# Patient Record
Sex: FEMALE | Employment: OTHER | URBAN - METROPOLITAN AREA
[De-identification: names, ages, dates, MRNs, and addresses within clinical notes are randomized per-mention and may not be internally consistent; named-entity substitution may affect disease eponyms.]

---

## 2020-08-13 NOTE — PROGRESS NOTES
SUBJECTIVE:   CC: Kashmir Bonilla is an 39 year old woman who presents for preventive health visit.     Healthy Habits:     Getting at least 3 servings of Calcium per day:  Yes    Bi-annual eye exam:  NO    Dental care twice a year:  NO    Sleep apnea or symptoms of sleep apnea:  None    Diet:  Regular (no restrictions)    Frequency of exercise:  1 day/week    Duration of exercise:  30-45 minutes    Taking medications regularly:  Yes    Medication side effects:  Significant flushing    PHQ-2 Total Score: 0    Additional concerns today:  No    -------------------------------------    Today's PHQ-2 Score:   PHQ-2 ( 1999 Pfizer) 8/18/2020   Q1: Little interest or pleasure in doing things 0   Q2: Feeling down, depressed or hopeless 0   PHQ-2 Score 0   Q1: Little interest or pleasure in doing things Not at all   Q2: Feeling down, depressed or hopeless Not at all   PHQ-2 Score 0       Abuse: Current or Past(Physical, Sexual or Emotional)- No  Do you feel safe in your environment? Yes        Social History     Tobacco Use     Smoking status: Never Smoker     Smokeless tobacco: Never Used   Substance Use Topics     Alcohol use: Never     Frequency: Never     Alcohol Use 8/18/2020   Prescreen: >3 drinks/day or >7 drinks/week? No       Reviewed orders with patient.  Reviewed health maintenance and updated orders accordingly - Yes  Labs reviewed in EPIC  BP Readings from Last 3 Encounters:   08/18/20 112/70    Wt Readings from Last 3 Encounters:   08/18/20 89.8 kg (198 lb)                  Patient Active Problem List   Diagnosis     TB lung, latent     Hx of ectopic pregnancy     History of chlamydia     Controlled substance agreement signed     Iron deficiency anemia due to chronic blood loss     Eczema, unspecified type     History reviewed. No pertinent surgical history.    Social History     Tobacco Use     Smoking status: Never Smoker     Smokeless tobacco: Never Used   Substance Use Topics     Alcohol use: Never      "Frequency: Never     History reviewed. No pertinent family history.      Current Outpatient Medications   Medication Sig Dispense Refill     clobetasol (TEMOVATE) 0.05 % external cream Apply topically 2 times daily 60 g 0     ferrous sulfate (FEROSUL) 325 (65 Fe) MG tablet Take 1 tablet (325 mg) by mouth 3 times daily (with meals) 90 tablet 2     sulfamethoxazole-trimethoprim (BACTRIM DS) 800-160 MG tablet Take 1 tablet by mouth 2 times daily for 5 days 10 tablet 0     No Known Allergies  Recent Labs   Lab Test 08/18/20  1053   CR 0.86   GFRESTIMATED Not Calculated   GFRESTBLACK Not Calculated   POTASSIUM 3.9        Mammogram not appropriate for this patient based on age.    Pertinent mammograms are reviewed under the imaging tab.  History of abnormal Pap smear: NO - age 30-65 PAP every 5 years with negative HPV co-testing recommended     Reviewed and updated as needed this visit by clinical staff  Tobacco  Allergies  Meds  Med Hx  Surg Hx  Fam Hx  Soc Hx        Reviewed and updated as needed this visit by Provider          History reviewed. No pertinent past medical history.   History reviewed. No pertinent surgical history.    Review of Systems   Constitutional: Negative for chills.   HENT: Negative for congestion.    Respiratory: Negative for cough.    Cardiovascular: Negative for chest pain.   Gastrointestinal: Positive for abdominal pain and constipation. Negative for diarrhea.   Genitourinary: Negative for hematuria.   Neurological: Negative for dizziness.        OBJECTIVE:   /70   Pulse 77   Temp 97.5  F (36.4  C) (Temporal)   Ht 1.57 m (5' 1.81\")   Wt 89.8 kg (198 lb)   LMP 08/02/2020   SpO2 99%   BMI 36.44 kg/m    Physical Exam  Constitutional:       General: Kashmir Bonilla is not in acute distress.     Appearance: Normal appearance. Kashmir Bonilla is well-developed and normal weight. Kashmir Bonilla is not ill-appearing, toxic-appearing or diaphoretic.   HENT:      Head: " Normocephalic and atraumatic.      Right Ear: Tympanic membrane, ear canal and external ear normal.      Left Ear: Tympanic membrane, ear canal and external ear normal.      Nose: No rhinorrhea.      Mouth/Throat:      Mouth: Mucous membranes are moist.      Pharynx: Oropharynx is clear. No oropharyngeal exudate.   Eyes:      Extraocular Movements: Extraocular movements intact.      Conjunctiva/sclera: Conjunctivae normal.      Pupils: Pupils are equal, round, and reactive to light.   Neck:      Musculoskeletal: Normal range of motion and neck supple.   Cardiovascular:      Rate and Rhythm: Normal rate and regular rhythm.      Pulses: Normal pulses.      Heart sounds: Normal heart sounds. No murmur. No friction rub. No gallop.    Pulmonary:      Effort: Pulmonary effort is normal. No respiratory distress.      Breath sounds: Normal breath sounds. No stridor. No wheezing, rhonchi or rales.   Chest:      Chest wall: No tenderness.   Abdominal:      General: Bowel sounds are normal. There is no distension.      Palpations: Abdomen is soft. There is no mass.      Tenderness: There is no abdominal tenderness. There is no right CVA tenderness, left CVA tenderness, guarding or rebound.      Hernia: No hernia is present.   Musculoskeletal: Normal range of motion.         General: No tenderness.   Neurological:      General: No focal deficit present.      Mental Status: Kashmir Bonilla is alert and oriented to person, place, and time.   Psychiatric:         Mood and Affect: Mood normal.         Behavior: Behavior normal.         Thought Content: Thought content normal.         Judgment: Judgment normal.       Diagnostic Test Results:  Labs reviewed in Epic    ASSESSMENT/PLAN:     1. Routine general medical examination at a health care facility    2. Iron deficiency anemia due to chronic blood loss  Patient has ongoing symptoms of heavy menstrual.'s and the check on her CBC showed a hemoglobin of 7 indicating moderate to  "severe anemia.  She has a angiogram (could not man-hour to tell me gynecologist that she follows through with OGI and has been diagnosed with fibroids but plans on conceiving another child. Advised to take iron supplementation to help with anemia  - CBC with platelets and differential  - Basic metabolic panel  (Ca, Cl, CO2, Creat, Gluc, K, Na, BUN)    3. Eczema, unspecified type  - clobetasol (TEMOVATE) 0.05 % external cream; Apply topically 2 times daily  Dispense: 60 g; Refill: 0    4. Encounter for counseling regarding immunization  She is planning on going to college and needs documentation of prior immunization which she had done in [place] and does not have any records of.  titres ordered  Forms filled out  - Rubeola Antibody IgG  - Rubella Antibody IgG Quantitative  - Quantiferon-TB Gold Plus  - Mumps Immune Status, IgG  - Poliovirus Antibodies  - Hepatitis B Surface Antibody  - Varicella Zoster Virus Antibody IgG    5. Screening for HIV (human immunodeficiency virus)  - HIV Screening      COUNSELING:  Reviewed preventive health counseling, as reflected in patient instructions       Regular exercise       Healthy diet/nutrition    Estimated body mass index is 36.44 kg/m  as calculated from the following:    Height as of this encounter: 1.57 m (5' 1.81\").    Weight as of this encounter: 89.8 kg (198 lb).         reports that Kashmir Bonilla has never smoked. Kashmir Bonilla has never used smokeless tobacco.      Counseling Resources:  ATP IV Guidelines  Pooled Cohorts Equation Calculator  Breast Cancer Risk Calculator  FRAX Risk Assessment  ICSI Preventive Guidelines  Dietary Guidelines for Americans, 2010  USDA's MyPlate  ASA Prophylaxis  Lung CA Screening    Lindsay Nelson MD  [facility]  Answers for HPI/ROS submitted by the patient on 8/18/2020   Annual Exam:  Blood in stool: No    "

## 2020-08-18 ENCOUNTER — OFFICE VISIT (OUTPATIENT)
Dept: FAMILY MEDICINE | Facility: OTHER | Age: 40
End: 2020-08-18
Payer: COMMERCIAL

## 2020-08-18 ENCOUNTER — TELEPHONE (OUTPATIENT)
Dept: FAMILY MEDICINE | Facility: OTHER | Age: 40
End: 2020-08-18

## 2020-08-18 VITALS
SYSTOLIC BLOOD PRESSURE: 112 MMHG | HEART RATE: 77 BPM | TEMPERATURE: 97.5 F | BODY MASS INDEX: 36.44 KG/M2 | WEIGHT: 198 LBS | HEIGHT: 62 IN | OXYGEN SATURATION: 99 % | DIASTOLIC BLOOD PRESSURE: 70 MMHG

## 2020-08-18 DIAGNOSIS — L30.9 ECZEMA, UNSPECIFIED TYPE: ICD-10-CM

## 2020-08-18 DIAGNOSIS — Z71.85 ENCOUNTER FOR COUNSELING REGARDING IMMUNIZATION: ICD-10-CM

## 2020-08-18 DIAGNOSIS — Z00.00 ROUTINE GENERAL MEDICAL EXAMINATION AT A HEALTH CARE FACILITY: Primary | ICD-10-CM

## 2020-08-18 DIAGNOSIS — D50.0 IRON DEFICIENCY ANEMIA DUE TO CHRONIC BLOOD LOSS: ICD-10-CM

## 2020-08-18 DIAGNOSIS — Z11.4 SCREENING FOR HIV (HUMAN IMMUNODEFICIENCY VIRUS): ICD-10-CM

## 2020-08-18 DIAGNOSIS — D50.0 IRON DEFICIENCY ANEMIA DUE TO CHRONIC BLOOD LOSS: Primary | ICD-10-CM

## 2020-08-18 LAB
ANION GAP SERPL CALCULATED.3IONS-SCNC: 4 MMOL/L
BASOPHILS # BLD AUTO: 0.1 10E9/L (ref 0–0.2)
BASOPHILS NFR BLD AUTO: 1.8 %
BUN SERPL-MCNC: 7 MG/DL
CALCIUM SERPL-MCNC: 8.6 MG/DL
CHLORIDE SERPL-SCNC: 107 MMOL/L
CO2 SERPL-SCNC: 27 MMOL/L
CREAT SERPL-MCNC: 0.86 MG/DL
DIFFERENTIAL METHOD BLD: ABNORMAL
EOSINOPHIL # BLD AUTO: 0.1 10E9/L (ref 0–0.7)
EOSINOPHIL NFR BLD AUTO: 4 %
ERYTHROCYTE [DISTWIDTH] IN BLOOD BY AUTOMATED COUNT: 21.7 % (ref 10–15)
GFR SERPL CREATININE-BSD FRML MDRD: NORMAL ML/MIN/{1.73_M2}
GLUCOSE SERPL-MCNC: 92 MG/DL (ref 70–99)
HCT VFR BLD AUTO: 25.1 %
HGB BLD-MCNC: 7 G/DL
LYMPHOCYTES # BLD AUTO: 1.2 10E9/L (ref 0.8–5.3)
LYMPHOCYTES NFR BLD AUTO: 37.5 %
MCH RBC QN AUTO: 17 PG (ref 26.5–33)
MCHC RBC AUTO-ENTMCNC: 27.9 G/DL (ref 31.5–36.5)
MCV RBC AUTO: 61 FL (ref 78–100)
MONOCYTES # BLD AUTO: 0.3 10E9/L (ref 0–1.3)
MONOCYTES NFR BLD AUTO: 9.8 %
NEUTROPHILS # BLD AUTO: 1.5 10E9/L (ref 1.6–8.3)
NEUTROPHILS NFR BLD AUTO: 46.9 %
PLATELET # BLD AUTO: 241 10E9/L
POTASSIUM SERPL-SCNC: 3.9 MMOL/L
RBC # BLD AUTO: 4.12 10E12/L
SODIUM SERPL-SCNC: 138 MMOL/L
WBC # BLD AUTO: 3.3 10E9/L

## 2020-08-18 PROCEDURE — 86787 VARICELLA-ZOSTER ANTIBODY: CPT | Performed by: FAMILY MEDICINE

## 2020-08-18 PROCEDURE — 99385 PREV VISIT NEW AGE 18-39: CPT | Performed by: FAMILY MEDICINE

## 2020-08-18 PROCEDURE — 86658 ENTEROVIRUS ANTIBODY: CPT | Mod: 90 | Performed by: FAMILY MEDICINE

## 2020-08-18 PROCEDURE — 86765 RUBEOLA ANTIBODY: CPT | Performed by: FAMILY MEDICINE

## 2020-08-18 PROCEDURE — 86762 RUBELLA ANTIBODY: CPT | Performed by: FAMILY MEDICINE

## 2020-08-18 PROCEDURE — 86706 HEP B SURFACE ANTIBODY: CPT | Performed by: FAMILY MEDICINE

## 2020-08-18 PROCEDURE — 99000 SPECIMEN HANDLING OFFICE-LAB: CPT | Performed by: FAMILY MEDICINE

## 2020-08-18 PROCEDURE — 36415 COLL VENOUS BLD VENIPUNCTURE: CPT | Performed by: FAMILY MEDICINE

## 2020-08-18 PROCEDURE — 85025 COMPLETE CBC W/AUTO DIFF WBC: CPT | Performed by: FAMILY MEDICINE

## 2020-08-18 PROCEDURE — 80048 BASIC METABOLIC PNL TOTAL CA: CPT | Performed by: FAMILY MEDICINE

## 2020-08-18 PROCEDURE — 99213 OFFICE O/P EST LOW 20 MIN: CPT | Mod: 25 | Performed by: FAMILY MEDICINE

## 2020-08-18 PROCEDURE — 86735 MUMPS ANTIBODY: CPT | Performed by: FAMILY MEDICINE

## 2020-08-18 PROCEDURE — 87389 HIV-1 AG W/HIV-1&-2 AB AG IA: CPT | Performed by: FAMILY MEDICINE

## 2020-08-18 PROCEDURE — 86481 TB AG RESPONSE T-CELL SUSP: CPT | Performed by: FAMILY MEDICINE

## 2020-08-18 RX ORDER — CLOBETASOL PROPIONATE 0.5 MG/G
CREAM TOPICAL 2 TIMES DAILY
Qty: 60 G | Refills: 0 | COMMUNITY
Start: 2020-08-18 | End: 2021-11-12

## 2020-08-18 SDOH — HEALTH STABILITY: MENTAL HEALTH: HOW OFTEN DO YOU HAVE A DRINK CONTAINING ALCOHOL?: NEVER

## 2020-08-18 ASSESSMENT — ENCOUNTER SYMPTOMS
CHILLS: 0
DIARRHEA: 0
HEMATURIA: 0
DIZZINESS: 0
ABDOMINAL PAIN: 1
COUGH: 0
CONSTIPATION: 1
HEMATOCHEZIA: 0

## 2020-08-18 ASSESSMENT — PAIN SCALES - GENERAL: PAINLEVEL: NO PAIN (0)

## 2020-08-18 ASSESSMENT — MIFFLIN-ST. JEOR: SCORE: 1694.37

## 2020-08-18 NOTE — TELEPHONE ENCOUNTER
Attempted to contact patient, unable to reach patient.     MD Leslie sent at 8/18/2020 12:58 PM CDT -----  Please inform pt that her hemoglobin levels are significantly low which could lead to her fatigue and tiredness. I would recommend starting iron supplementation to help this . Sent script for iron tablets. I would recommend a recheck on her labs in 3 months. Also advise an earlier appt with gyn to address the uterine fibroids.( She plans to see OGI)  Normal kidney function

## 2020-08-18 NOTE — TELEPHONE ENCOUNTER
----- Message from Lindsay Nelson MD sent at 8/18/2020 12:58 PM CDT -----  Please inform pt that her hemoglobin levels are significantly low which could lead to her fatigue and tiredness. I would recommend starting iron supplementation to help this . Sent script for iron tablets. I would recommend a recheck on her labs in 3 months. Also advise an earlier appt with gyn to address the uterine fibroids.( She plans to see OGI)

## 2020-08-19 LAB
HBV SURFACE AB SERPL IA-ACNC: 102.74 M[IU]/ML
HIV 1+2 AB+HIV1 P24 AG SERPL QL IA: NONREACTIVE
MEV IGG SER QL IA: 2.9 AI (ref 0–0.8)
MUV IGG SER QL IA: 3.4 AI (ref 0–0.8)
RUBV IGG SERPL IA-ACNC: 58 IU/ML
VZV IGG SER QL IA: 2.7 AI (ref 0–0.8)

## 2020-08-20 LAB
GAMMA INTERFERON BACKGROUND BLD IA-ACNC: 0.07 IU/ML
M TB IFN-G CD4+ BCKGRND COR BLD-ACNC: 9.93 IU/ML
M TB TUBERC IFN-G BLD QL: NEGATIVE
MITOGEN IGNF BCKGRD COR BLD-ACNC: 0.02 IU/ML
MITOGEN IGNF BCKGRD COR BLD-ACNC: 0.02 IU/ML

## 2020-08-24 LAB
PV1 NAB TITR SER NT: NORMAL {TITER}
PV3 NAB TITR SER NT: NORMAL {TITER}

## 2020-08-25 ENCOUNTER — TELEPHONE (OUTPATIENT)
Dept: FAMILY MEDICINE | Facility: OTHER | Age: 40
End: 2020-08-25

## 2020-08-25 NOTE — TELEPHONE ENCOUNTER
Spoke with patient regarding message below.  Placed forms at .  Patient will call back to schedule Polio vaccine if she decides to get it.  Fang Noe CMA ([facility])     Stable; poor function, seen by kidney docs in past, not wanting more tx.  Continue to follow

## 2020-08-25 NOTE — TELEPHONE ENCOUNTER
Reason for Call:  Other Paperwork    Detailed comments: Patient is calling in today in regards to results and paperwork. I reviewed the results with patient today. Patient stated that she brought paperwork to her appointment and is wondering if this was completed and when it would be completed?  Thank you    Phone Number Patient can be reached at: Home number on file [phone] (home)    Best Time: anytime    Can we leave a detailed message on this number? YES    Call taken on 8/25/2020 at 11:08 AM by Kylah Leon

## 2020-08-26 NOTE — TELEPHONE ENCOUNTER
Patient came into clinic to  paperwork and said she would like to get the Iron Supplement RX filled. Please send to [facility] in [place].

## 2020-08-27 ENCOUNTER — TELEPHONE (OUTPATIENT)
Dept: FAMILY MEDICINE | Facility: OTHER | Age: 40
End: 2020-08-27

## 2020-08-27 DIAGNOSIS — Z11.3 SCREEN FOR STD (SEXUALLY TRANSMITTED DISEASE): ICD-10-CM

## 2020-08-27 DIAGNOSIS — N89.8 VAGINAL DISCHARGE: Primary | ICD-10-CM

## 2020-08-27 RX ORDER — FERROUS SULFATE 325(65) MG
325 TABLET ORAL
Qty: 90 TABLET | Refills: 2 | Status: SHIPPED | OUTPATIENT
Start: 2020-08-27 | End: 2021-11-12

## 2020-08-27 NOTE — TELEPHONE ENCOUNTER
Left message asking patient to return call.  please inform pt        Lindsay Nelson MD   8/27/2020 10:50 AM       These vaccines are not routinely administered in adults in the Hospitals in [place]      Abiola Carter MA   8/25/2020  4:40 PM       Pt question: Does patient need the vaccine?   She doesn't want it but it would be for school   JUDITH Caban Rekha, MD   8/25/2020  2:32 PM       Polio antibodies noted against type 3 , not type 1 .

## 2020-08-27 NOTE — TELEPHONE ENCOUNTER
Patient schedule tomorrow for telephone visit. Please schedule her for labs prior to visit so we can discuss. Wet prep and Urine ordered. Does she want any additional testing such as STD etc. If so please let me know so I can order.       ALEAH Branch CNP  Questions or concerns please feel free to send me a [facility] message or call me  Phone : [phone]

## 2020-08-27 NOTE — PROGRESS NOTES
"Kashmir Bonilla is a 39 year old adult who is being evaluated via a billable telephone visit.      The patient has been notified of following:     \"This telephone visit will be conducted via a call between you and your physician/provider. We have found that certain health care needs can be provided without the need for a physical exam.  This service lets us provide the care you need with a short phone conversation.  If a prescription is necessary we can send it directly to your pharmacy.  If lab work is needed we can place an order for that and you can then stop by our lab to have the test done at a later time.    Telephone visits are billed at different rates depending on your insurance coverage. During this emergency period, for some insurers they may be billed the same as an in-person visit.  Please reach out to your insurance provider with any questions.    If during the course of the call the physician/provider feels a telephone visit is not appropriate, you will not be charged for this service.\"    Patient has given verbal consent for Telephone visit?  Yes    What phone number would you like to be contacted at?     How would you like to obtain your AVS? Meryhart    Subjective     Kashmir Bonilla is a 39 year old adult who presents via phone visit today for the following health issues:    HPI    Genitourinary - Female  Onset/Duration: A couple months.   Description:   Painful urination (Dysuria): YES- before her period and then after her period it stops.            Frequency: no  Blood in urine (Hematuria): no  Delay in urine (Hesitency): no  Intensity: mild  Progression of Symptoms:  waxing and waning  Accompanying Signs & Symptoms:  Fever/chills: no  Flank pain: no  Nausea and vomiting: no  Vaginal symptoms: Yes, cream colored   Abdominal/Pelvic Pain: no  History:   History of frequent UTI s: no  History of kidney stones: no  Sexually Active: YES  Possibility of pregnancy: Yes  Precipitating or alleviating " factors: None  Therapies tried and outcome:       ANTIBIOTIC LAST TIME HELPED.     LMP normal vaginal bleeding.         Review of Systems          Objective          Vitals:  No vitals were obtained today due to virtual visit.    healthy, alert and no distress  PSYCH: Alert and oriented times 3; coherent speech, normal   rate and volume, able to articulate logical thoughts, able   to abstract reason, no tangential thoughts, no hallucinations   or delusions  Kashmir Bonilla's affect is normal  RESP: No cough, no audible wheezing, able to talk in full sentences  Remainder of exam unable to be completed due to telephone visits    Results for orders placed or performed in visit on 08/28/20   HIV Antigen Antibody Combo     Status: None   Result Value Ref Range    HIV Antigen Antibody Combo Nonreactive NR^Nonreactive       Treponema Abs w Reflex to RPR and Titer     Status: None   Result Value Ref Range    Treponema Antibodies Nonreactive NR^Nonreactive   Wet prep     Status: Abnormal    Specimen: Vagina   Result Value Ref Range    Specimen Description Vagina     Wet Prep Moderate  Yeast seen   (A)     Wet Prep Few  WBC'S seen       Wet Prep No Trichomonas seen     Wet Prep No clue cells seen            Assessment/Plan:    Assessment & Plan     Vaginal discharge  - Wet prep with yeast.   - Discussed 1 dose of Diflucan.   - Follow up if not improve.   - fluconazole (DIFLUCAN) 150 MG tablet; Take 1 tablet (150 mg) by mouth once for 1 dose    Yeast infection of the vagina    - fluconazole (DIFLUCAN) 150 MG tablet; Take 1 tablet (150 mg) by mouth once for 1 dose    Dysuria  - Pain with urination.   - STD testing  - UA concerns for UTI given symptoms.   - She has done bactrim in the past and requested this again. I do not see record of this in [place]. She denies any allergy to this medication  - Recommend Bactrim and await culture, will call her with results  - Encourage fluids and monitor symptoms over the weekend. If no  improvements follow up, if worsening symptoms go in over the weekend.   - sulfamethoxazole-trimethoprim (BACTRIM DS) 800-160 MG tablet; Take 1 tablet by mouth 2 times daily for 5 days           The patient indicates understanding of these issues and agrees with the plan.    There are no Patient Instructions on file for this visit.    Return in about 3 days (around 8/31/2020) for If not improved.    ALEAH Branch [facility]    Phone call duration:  10 minutes

## 2020-08-28 ENCOUNTER — VIRTUAL VISIT (OUTPATIENT)
Dept: FAMILY MEDICINE | Facility: OTHER | Age: 40
End: 2020-08-28
Payer: COMMERCIAL

## 2020-08-28 DIAGNOSIS — R30.0 DYSURIA: ICD-10-CM

## 2020-08-28 DIAGNOSIS — N89.8 VAGINAL DISCHARGE: Primary | ICD-10-CM

## 2020-08-28 DIAGNOSIS — B37.31 YEAST INFECTION OF THE VAGINA: ICD-10-CM

## 2020-08-28 DIAGNOSIS — N89.8 VAGINAL DISCHARGE: ICD-10-CM

## 2020-08-28 DIAGNOSIS — Z11.3 SCREEN FOR STD (SEXUALLY TRANSMITTED DISEASE): ICD-10-CM

## 2020-08-28 LAB
HIV 1+2 AB+HIV1 P24 AG SERPL QL IA: NONREACTIVE
SPECIMEN SOURCE: ABNORMAL
WET PREP SPEC: ABNORMAL

## 2020-08-28 PROCEDURE — 86780 TREPONEMA PALLIDUM: CPT | Performed by: NURSE PRACTITIONER

## 2020-08-28 PROCEDURE — 87210 SMEAR WET MOUNT SALINE/INK: CPT | Performed by: NURSE PRACTITIONER

## 2020-08-28 PROCEDURE — 36415 COLL VENOUS BLD VENIPUNCTURE: CPT | Performed by: NURSE PRACTITIONER

## 2020-08-28 PROCEDURE — 87491 CHLMYD TRACH DNA AMP PROBE: CPT | Performed by: NURSE PRACTITIONER

## 2020-08-28 PROCEDURE — 87389 HIV-1 AG W/HIV-1&-2 AB AG IA: CPT | Performed by: NURSE PRACTITIONER

## 2020-08-28 PROCEDURE — 87591 N.GONORRHOEAE DNA AMP PROB: CPT | Performed by: NURSE PRACTITIONER

## 2020-08-28 PROCEDURE — 99213 OFFICE O/P EST LOW 20 MIN: CPT | Mod: TEL | Performed by: NURSE PRACTITIONER

## 2020-08-28 PROCEDURE — 87522 HEPATITIS C REVRS TRNSCRPJ: CPT | Performed by: NURSE PRACTITIONER

## 2020-08-28 RX ORDER — SULFAMETHOXAZOLE/TRIMETHOPRIM 800-160 MG
1 TABLET ORAL 2 TIMES DAILY
Qty: 10 TABLET | Refills: 0 | Status: SHIPPED | OUTPATIENT
Start: 2020-08-28 | End: 2020-09-02

## 2020-08-28 RX ORDER — FLUCONAZOLE 150 MG/1
150 TABLET ORAL ONCE
Qty: 1 TABLET | Refills: 0 | Status: SHIPPED | OUTPATIENT
Start: 2020-08-28 | End: 2020-08-28

## 2020-08-28 NOTE — TELEPHONE ENCOUNTER
Ok I put in urine and vaginal swab, blood.       ALEAH Branch CNP  Questions or concerns please feel free to send me a Real [employer] message or call me  Phone : [phone]

## 2020-08-28 NOTE — TELEPHONE ENCOUNTER
Spoke to patient and scheduled for labs. She would also like STD testing ordered as well    Kylie Madrid MA

## 2020-08-29 LAB — T PALLIDUM AB SER QL: NONREACTIVE

## 2020-08-30 PROBLEM — D50.0 IRON DEFICIENCY ANEMIA DUE TO CHRONIC BLOOD LOSS: Status: ACTIVE | Noted: 2020-08-30

## 2020-08-30 PROBLEM — L30.9 ECZEMA, UNSPECIFIED TYPE: Status: ACTIVE | Noted: 2020-08-30

## 2020-08-30 LAB
C TRACH DNA SPEC QL NAA+PROBE: NEGATIVE
N GONORRHOEA DNA SPEC QL NAA+PROBE: NEGATIVE
SPECIMEN SOURCE: NORMAL
SPECIMEN SOURCE: NORMAL

## 2020-08-31 LAB
HCV RNA SERPL NAA+PROBE-ACNC: NORMAL [IU]/ML
HCV RNA SERPL NAA+PROBE-LOG IU: NORMAL LOG IU/ML

## 2021-01-04 ENCOUNTER — HEALTH MAINTENANCE LETTER (OUTPATIENT)
Age: 41
End: 2021-01-04

## 2021-10-11 ENCOUNTER — HEALTH MAINTENANCE LETTER (OUTPATIENT)
Age: 41
End: 2021-10-11

## 2021-11-12 ENCOUNTER — VIRTUAL VISIT (OUTPATIENT)
Dept: FAMILY MEDICINE | Facility: CLINIC | Age: 41
End: 2021-11-12
Payer: COMMERCIAL

## 2021-11-12 DIAGNOSIS — Z11.52 ENCOUNTER FOR SCREENING FOR COVID-19: Primary | ICD-10-CM

## 2021-11-12 PROCEDURE — 99212 OFFICE O/P EST SF 10 MIN: CPT | Mod: TEL | Performed by: INTERNAL MEDICINE

## 2021-11-12 NOTE — PROGRESS NOTES
Kashmir is a 40 year old who is being evaluated via a billable telephone visit.      What phone number would you like to be contacted at? [phone]  How would you like to obtain your AVS? MyChart    Assessment & Plan     Encounter for screening for COVID-19  Patient is traveling and requests pretravel COVID-19 testing  - Asymptomatic COVID-19 Virus (Coronavirus) by PCR; Future             See Patient Instructions    No follow-ups on file.    Nam Young MD  [facility]    Domi Marlow is a 40 year old who presents for the following health issues     HPI 40-year-old who presents to request COVID-19 testing.  She is asymptomatic.  She is traveling in the near future.        Review of Systems   Asymptomatic      Objective           Vitals:  No vitals were obtained today due to virtual visit.    Physical Exam   healthy, alert and no distress  PSYCH: Alert and oriented times 3; coherent speech, normal   rate and volume, able to articulate logical thoughts, able   to abstract reason, no tangential thoughts, no hallucinations   or delusions  Kashmir Dumonts affect is normal  RESP: No cough, no audible wheezing, able to talk in full sentences  Remainder of exam unable to be completed due to telephone visits    Orders Only on 08/28/2020   Component Date Value Ref Range Status     HCV RNA Quant IU/ml 08/28/2020 HCV RNA Not Detected  HCVND^HCV RNA Not Detected [IU]/mL Final    Comment: The KATHERINE AmpliPrep/KATHERINE TaqMan HCV Test is an FDA-approved in vitro nucleic   acid amplification test for the quantitation of HCV RNA in human plasma (ETDA   plasma) or serum using the KATHERINE AmpliPrep Instrument for automated viral   nucleic acid extraction and the KATHERINE TaqMan Analyzer or KATHERINE TaqMan for   automated Real Time PCR amplification and detection of the viral nucleic acid   target.  Titer results are reported in International Units/mL (IU/mL) using the 1st WHO   International standard for  HCV for Nucleic Acid Amplification based assays.       Log of HCV RNA Qt 08/28/2020 Not Calculated  <1.2 Log IU/mL Final     Specimen Descrip 08/28/2020 Urine   Final     N Gonorrhea PCR 08/28/2020 Negative   Final    Comment: Negative for N. gonorrhoeae rRNA by transcription mediated amplification.  A negative result by transcription mediated amplification does not preclude   the presence of N. gonorrhoeae infection because results are dependent on   proper and adequate collection, absence of inhibitors, and sufficient rRNA to   be detected.       Specimen Description 08/28/2020 Urine   Final     Chlamydia Trachomatis PCR 08/28/2020 Negative  NEG^Negative Final    Comment: Negative for C. trachomatis rRNA by transcription mediated amplification.  A negative result by transcription mediated amplification does not preclude   the presence of C. trachomatis infection because results are dependent on   proper and adequate collection, absence of inhibitors, and sufficient rRNA to   be detected.       HIV Antigen Antibody Combo 08/28/2020 Nonreactive  NR^Nonreactive     Final    HIV-1 p24 Ag & HIV-1/HIV-2 Ab Not Detected     Treponema Antibodies 08/28/2020 Nonreactive  NR^Nonreactive Final    Comment: Methodology Change: Test performed on the [employer] Liaison XL by Treponema   pallidum Total Antibodies Assay as of 3.17.2020.       Specimen Description 08/28/2020 Vagina   Final     Wet Prep 08/28/2020 *  Final                    Value:Moderate  Yeast seen       Wet Prep 08/28/2020    Final                    Value:Few  WBC'S seen       Wet Prep 08/28/2020 No Trichomonas seen   Final     Wet Prep 08/28/2020 No clue cells seen   Final               Phone call duration: 5 minutes

## 2021-11-16 ENCOUNTER — LAB (OUTPATIENT)
Dept: LAB | Facility: OTHER | Age: 41
End: 2021-11-16
Attending: INTERNAL MEDICINE
Payer: COMMERCIAL

## 2021-11-16 DIAGNOSIS — Z11.52 ENCOUNTER FOR SCREENING FOR COVID-19: ICD-10-CM

## 2021-11-16 PROCEDURE — U0003 INFECTIOUS AGENT DETECTION BY NUCLEIC ACID (DNA OR RNA); SEVERE ACUTE RESPIRATORY SYNDROME CORONAVIRUS 2 (SARS-COV-2) (CORONAVIRUS DISEASE [COVID-19]), AMPLIFIED PROBE TECHNIQUE, MAKING USE OF HIGH THROUGHPUT TECHNOLOGIES AS DESCRIBED BY CMS-2020-01-R: HCPCS | Mod: 90

## 2021-11-16 PROCEDURE — U0005 INFEC AGEN DETEC AMPLI PROBE: HCPCS | Mod: 90

## 2021-11-16 PROCEDURE — 99000 SPECIMEN HANDLING OFFICE-LAB: CPT

## 2021-11-17 LAB
SARS-COV-2 RNA RESP QL NAA+PROBE: NORMAL
SARS-COV-2 RNA RESP QL NAA+PROBE: NOT DETECTED

## 2022-07-13 ENCOUNTER — HOSPITAL ENCOUNTER (OUTPATIENT)
Facility: CLINIC | Age: 42
Discharge: HOME | End: 2022-07-13
Attending: PHYSICIAN ASSISTANT | Admitting: PHYSICIAN ASSISTANT
Payer: COMMERCIAL

## 2022-07-13 ENCOUNTER — OFFICE VISIT (OUTPATIENT)
Dept: FAMILY MEDICINE | Facility: CLINIC | Age: 42
End: 2022-07-13
Payer: COMMERCIAL

## 2022-07-13 VITALS
WEIGHT: 200.9 LBS | RESPIRATION RATE: 12 BRPM | OXYGEN SATURATION: 99 % | DIASTOLIC BLOOD PRESSURE: 80 MMHG | TEMPERATURE: 97.5 F | HEART RATE: 66 BPM | BODY MASS INDEX: 37.93 KG/M2 | SYSTOLIC BLOOD PRESSURE: 118 MMHG | HEIGHT: 61 IN

## 2022-07-13 DIAGNOSIS — Z12.4 CERVICAL CANCER SCREENING: ICD-10-CM

## 2022-07-13 DIAGNOSIS — D25.9 UTERINE LEIOMYOMA, UNSPECIFIED LOCATION: ICD-10-CM

## 2022-07-13 DIAGNOSIS — Z12.31 ENCOUNTER FOR SCREENING MAMMOGRAM FOR MALIGNANT NEOPLASM OF BREAST: ICD-10-CM

## 2022-07-13 DIAGNOSIS — K59.00 CONSTIPATION, UNSPECIFIED CONSTIPATION TYPE: ICD-10-CM

## 2022-07-13 DIAGNOSIS — Z00.00 ROUTINE GENERAL MEDICAL EXAMINATION AT A HEALTH CARE FACILITY: Primary | ICD-10-CM

## 2022-07-13 DIAGNOSIS — D50.0 IRON DEFICIENCY ANEMIA DUE TO CHRONIC BLOOD LOSS: ICD-10-CM

## 2022-07-13 LAB
CHOLEST SERPL-MCNC: 149 MG/DL
ERYTHROCYTE [DISTWIDTH] IN BLOOD BY AUTOMATED COUNT: 19.4 % (ref 10–15)
FASTING STATUS PATIENT QL REPORTED: YES
FASTING STATUS PATIENT QL REPORTED: YES
FERRITIN SERPL-MCNC: 3 NG/ML (ref 12–150)
GLUCOSE BLD-MCNC: 89 MG/DL (ref 70–99)
HCT VFR BLD AUTO: 27.7 % (ref 35–47)
HDLC SERPL-MCNC: 52 MG/DL
HGB BLD-MCNC: 7.7 G/DL (ref 11.7–15.7)
IRON SATN MFR SERPL: 2 % (ref 15–46)
IRON SERPL-MCNC: 10 UG/DL (ref 35–180)
LDLC SERPL CALC-MCNC: 81 MG/DL
MCH RBC QN AUTO: 17.2 PG (ref 26.5–33)
MCHC RBC AUTO-ENTMCNC: 27.8 G/DL (ref 31.5–36.5)
MCV RBC AUTO: 62 FL (ref 78–100)
NONHDLC SERPL-MCNC: 97 MG/DL
PLATELET # BLD AUTO: 325 10E3/UL (ref 150–450)
RBC # BLD AUTO: 4.48 10E6/UL (ref 3.8–5.2)
TIBC SERPL-MCNC: 407 UG/DL (ref 240–430)
TRIGL SERPL-MCNC: 79 MG/DL
TSH SERPL DL<=0.005 MIU/L-ACNC: 1.87 MU/L (ref 0.4–4)
WBC # BLD AUTO: 3.4 10E3/UL (ref 4–11)

## 2022-07-13 PROCEDURE — 83550 IRON BINDING TEST: CPT | Performed by: PHYSICIAN ASSISTANT

## 2022-07-13 PROCEDURE — G0145 SCR C/V CYTO,THINLAYER,RESCR: HCPCS

## 2022-07-13 PROCEDURE — 99214 OFFICE O/P EST MOD 30 MIN: CPT | Mod: 25 | Performed by: PHYSICIAN ASSISTANT

## 2022-07-13 PROCEDURE — 82947 ASSAY GLUCOSE BLOOD QUANT: CPT | Performed by: PHYSICIAN ASSISTANT

## 2022-07-13 PROCEDURE — 84443 ASSAY THYROID STIM HORMONE: CPT | Performed by: PHYSICIAN ASSISTANT

## 2022-07-13 PROCEDURE — 90715 TDAP VACCINE 7 YRS/> IM: CPT | Performed by: PHYSICIAN ASSISTANT

## 2022-07-13 PROCEDURE — 90471 IMMUNIZATION ADMIN: CPT | Performed by: PHYSICIAN ASSISTANT

## 2022-07-13 PROCEDURE — 36415 COLL VENOUS BLD VENIPUNCTURE: CPT | Performed by: PHYSICIAN ASSISTANT

## 2022-07-13 PROCEDURE — 80061 LIPID PANEL: CPT | Performed by: PHYSICIAN ASSISTANT

## 2022-07-13 PROCEDURE — 85027 COMPLETE CBC AUTOMATED: CPT | Performed by: PHYSICIAN ASSISTANT

## 2022-07-13 PROCEDURE — 82728 ASSAY OF FERRITIN: CPT | Performed by: PHYSICIAN ASSISTANT

## 2022-07-13 PROCEDURE — 99396 PREV VISIT EST AGE 40-64: CPT | Mod: 25 | Performed by: PHYSICIAN ASSISTANT

## 2022-07-13 PROCEDURE — 87624 HPV HI-RISK TYP POOLED RSLT: CPT

## 2022-07-13 RX ORDER — FERROUS SULFATE 325(65) MG
325 TABLET ORAL
Qty: 90 TABLET | Refills: 1 | Status: SHIPPED | OUTPATIENT
Start: 2022-07-13

## 2022-07-13 ASSESSMENT — ENCOUNTER SYMPTOMS
DYSURIA: 0
HEARTBURN: 0
FREQUENCY: 0
CONSTIPATION: 1
SHORTNESS OF BREATH: 0
ARTHRALGIAS: 0
ABDOMINAL PAIN: 0
PALPITATIONS: 0
DIARRHEA: 0
COUGH: 0
MYALGIAS: 0
FEVER: 0
SORE THROAT: 0
EYE PAIN: 0
NAUSEA: 0
NERVOUS/ANXIOUS: 0
DIZZINESS: 0
HEADACHES: 0
HEMATURIA: 0
CHILLS: 0
WEAKNESS: 0
PARESTHESIAS: 0
JOINT SWELLING: 0
BREAST MASS: 0
HEMATOCHEZIA: 0

## 2022-07-13 ASSESSMENT — PAIN SCALES - GENERAL: PAINLEVEL: NO PAIN (0)

## 2022-07-13 NOTE — PATIENT INSTRUCTIONS
To schedule your Imaging Test or Specialty Referral please call: Mammogram & OBGYN    [facility]   Radiology (imaging- mammogram, ultrasound, CT, MRI): [phone]  Speciality consultation [phone]  [address]  [facility] [address]       Preventive Health Recommendations  Female Ages 40 to 49    Yearly exam:   See your health care provider every year in order to  Review health changes.   Discuss preventive care.    Review your medicines if your doctor prescribed any.    Get a Pap test every three years (unless you have an abnormal result and your provider advises testing more often).    If you get Pap tests with HPV test, you only need to test every 5 years, unless you have an abnormal result. You do not need a Pap test if your uterus was removed (hysterectomy) and you have not had cancer.    You should be tested each year for STDs (sexually transmitted diseases), if you're at risk.   Ask your doctor if you should have a mammogram.    Have a colonoscopy (test for colon cancer) if someone in your family has had colon cancer or polyps before age 50.     Have a cholesterol test every 5 years.     Have a diabetes test (fasting glucose) after age 45. If you are at risk for diabetes, you should have this test every 3 years.    Shots: Get a flu shot each year. Get a tetanus shot every 10 years.     Nutrition:   Eat at least 5 servings of fruits and vegetables each day.  Eat whole-grain bread, whole-wheat pasta and brown rice instead of white grains and rice.  Get adequate Calcium and Vitamin D.      Lifestyle  Exercise at least 150 minutes a week (an average of 30 minutes a day, 5 days a week). This will help you control your weight and prevent disease.  Limit alcohol to one drink per day.  No smoking.   Wear sunscreen to prevent skin cancer.  See your dentist every six months for an exam and cleaning.

## 2022-07-13 NOTE — PROGRESS NOTES
SUBJECTIVE:   CC: Kashmir Bonilla is an 41 year old woman who presents for preventive health visit.     Patient has been advised of split billing requirements and indicates understanding: Yes     Healthy Habits:     Getting at least 3 servings of Calcium per day:  Yes    Bi-annual eye exam:  Yes    Dental care twice a year:  NO    Sleep apnea or symptoms of sleep apnea:  None    Diet:  Regular (no restrictions), Low salt, Low fat/cholesterol, Gluten-free/reduced and Breakfast skipped    Frequency of exercise:  None    Taking medications regularly:  Yes    Medication side effects:  None    PHQ-2 Total Score: 0    Additional concerns today:  No    Routine Health Maintenance:  Fasting: yes  Immunizations Due For:declines covid . tetanus will be due Oct 2022.   Fam hx of breast cancer: no.   Fam hx of colon cancer: no    Lipids screening: normal in 2019  Diabetes screening: normal prior. No GDM    GYN Hx:  Pap: Last: 06/21/2019- NIL, HPV not done, due. Prior Pap Hx: always normal   Periods:   heavy periods & fibroids   Periods regular, monthly  Days of bleeding- 5 days. Heavy bleeding for 3 days- pads- changing every 2 hr.   [place].   She states she was advised a total hysterectomy    Sexually active: yes  Contraception: open to pregnancy, 2 children (23yo anf 14yo); not taking a prenatal.   Denies GYN concerns of  PCB, pelvic pain, dyspareunia, vaginal discharge    Anemia- due to menorrhagia sx, fibroids, iron deficiency. Last hgb 7.0 g/dl. She states has never consistently taken an iron supplement.      Constipation a long time- takes senna daily. Then has BM daily. If no senna, BM every 3 days. No bleeding.   miralax- did not work for her.    Note exercising- has pelvic pain from fibroids w/exercise.       Today's PHQ-2 Score:   PHQ-2 ( 1999 Pfizer) 7/13/2022   Q1: Little interest or pleasure in doing things 0   Q2: Feeling down, depressed or hopeless 0   PHQ-2 Score 0   PHQ-2  Total Score (12-17 Years)- Positive if 3 or more points; Administer PHQ-A if positive -   Q1: Little interest or pleasure in doing things Not at all   Q2: Feeling down, depressed or hopeless Not at all   PHQ-2 Score 0       Abuse: Current or Past (Physical, Sexual or Emotional) - No  Do you feel safe in your environment? Yes    Have you ever done Advance Care Planning? (For example, a Health Directive, POLST, or a discussion with a medical provider or your loved ones about your wishes): No, advance care planning information given to patient to review.  Patient plans to discuss their wishes with loved ones or provider.      Social History     Tobacco Use     Smoking status: Never Smoker     Smokeless tobacco: Never Used   Substance Use Topics     Alcohol use: Never     If you drink alcohol do you typically have >3 drinks per day or >7 drinks per week? No    Alcohol Use 2022   Prescreen: >3 drinks/day or >7 drinks/week? No       Reviewed orders with patient.  Reviewed health maintenance and updated orders accordingly - Yes  Lab work is in process  Labs reviewed in EPIC  BP Readings from Last 3 Encounters:   22 118/80   20 112/70    Wt Readings from Last 3 Encounters:   22 91.1 kg (200 lb 14.4 oz)   20 89.8 kg (198 lb)                  Patient Active Problem List   Diagnosis     TB lung, latent     Hx of ectopic pregnancy     History of chlamydia     Controlled substance agreement signed     Iron deficiency anemia due to chronic blood loss     Eczema, unspecified type     Past Surgical History:   Procedure Laterality Date      SECTION       salpingectomy         Social History     Tobacco Use     Smoking status: Never Smoker     Smokeless tobacco: Never Used   Substance Use Topics     Alcohol use: Yes     Comment: occ. a few per month     Family History   Problem Relation Age of Onset     Hypertension Mother      Heart Disease Father          Current Outpatient Medications  "  Medication Sig Dispense Refill     ferrous sulfate (FEROSUL) 325 (65 Fe) MG tablet Take 1 tablet (325 mg) by mouth daily (with breakfast) 90 tablet 1     No Known Allergies    Breast Cancer Screening:    Breast CA Risk Assessment (FHS-7) 7/13/2022   Do you have a family history of breast, colon, or ovarian cancer? No / Unknown         Mammogram Screening - Offered annual screening and updated Health Maintenance for mutual plan based on risk factor consideration    Pertinent mammograms are reviewed under the imaging tab.    History of abnormal Pap smear: NO - age 30-65 PAP every 5 years with negative HPV co-testing recommended     Reviewed and updated as needed this visit by clinical staff                    Reviewed and updated as needed this visit by Provider                       Review of Systems   Constitutional: Negative for chills and fever.   HENT: Negative for congestion, ear pain, hearing loss and sore throat.    Eyes: Negative for pain and visual disturbance.   Respiratory: Negative for cough and shortness of breath.    Cardiovascular: Negative for chest pain, palpitations and peripheral edema.   Gastrointestinal: Positive for constipation. Negative for abdominal pain, diarrhea, heartburn, hematochezia and nausea.   Breasts:  Negative for tenderness, breast mass and discharge.   Genitourinary: Negative for dysuria, frequency, genital sores, hematuria, pelvic pain, urgency, vaginal bleeding and vaginal discharge.   Musculoskeletal: Negative for arthralgias, joint swelling and myalgias.   Skin: Negative for rash.   Neurological: Negative for dizziness, weakness, headaches and paresthesias.   Psychiatric/Behavioral: Negative for mood changes. The patient is not nervous/anxious.         OBJECTIVE:   /80   Pulse 66   Temp 97.5  F (36.4  C) (Temporal)   Resp 12   Ht 1.549 m (5' 1\")   Wt 91.1 kg (200 lb 14.4 oz)   LMP 06/30/2022   SpO2 99%   BMI 37.96 kg/m    Physical Exam  GENERAL: healthy, alert " and no distress  EYES: Eyes grossly normal to inspection, PERRL and conjunctivae and sclerae normal  HENT: ear canals and TM's normal, nose and mouth without ulcers or lesions  NECK: no adenopathy, no asymmetry, masses, or scars and thyroid normal to palpation  RESP: lungs clear to auscultation - no rales, rhonchi or wheezes  BREAST: normal without masses, tenderness or nipple discharge and no palpable axillary masses or adenopathy  CV: regular rate and rhythm, normal S1 S2, no S3 or S4, no murmur, click or rub, no peripheral edema and peripheral pulses strong  ABDOMEN: soft, nontender, no hepatosplenomegaly, no masses and bowel sounds normal   (female): normal female external genitalia, normal urethral meatus , vaginal mucosa pink, moist, well ruggated, cervix normal, uterine enlargement and irregularity on exam  MS: no gross musculoskeletal defects noted, no edema  SKIN: no suspicious lesions or rashes  NEURO: Normal strength and tone, mentation intact and speech normal  PSYCH: mentation appears normal, affect normal/bright    Diagnostic Test Results:  Labs reviewed in Epic  Results for orders placed or performed in visit on 07/13/22 (from the past 24 hour(s))   CBC with platelets   Result Value Ref Range    WBC Count 3.4 (L) 4.0 - 11.0 10e3/uL    RBC Count 4.48 3.80 - 5.20 10e6/uL    Hemoglobin 7.7 (LL) 11.7 - 15.7 g/dL    Hematocrit 27.7 (L) 35.0 - 47.0 %    MCV 62 (L) 78 - 100 fL    MCH 17.2 (L) 26.5 - 33.0 pg    MCHC 27.8 (L) 31.5 - 36.5 g/dL    RDW 19.4 (H) 10.0 - 15.0 %    Platelet Count 325 150 - 450 10e3/uL   Iron & Iron Binding Capacity   Result Value Ref Range    Iron 10 (L) 35 - 180 ug/dL    Iron Binding Capacity 407 240 - 430 ug/dL    Iron Sat Index 2 (L) 15 - 46 %   Ferritin   Result Value Ref Range    Ferritin 3 (L) 12 - 150 ng/mL   TSH with free T4 reflex   Result Value Ref Range    TSH 1.87 0.40 - 4.00 mU/L   Lipid panel reflex to direct LDL Fasting   Result Value Ref Range    Cholesterol 149  <200 mg/dL    Triglycerides 79 <150 mg/dL    Direct Measure HDL 52 >=50 mg/dL    LDL Cholesterol Calculated 81 <=100 mg/dL    Non HDL Cholesterol 97 <130 mg/dL    Patient Fasting > 8hrs? Yes     Narrative    Cholesterol  Desirable:  <200 mg/dL    Triglycerides  Normal:  Less than 150 mg/dL  Borderline High:  150-199 mg/dL  High:  200-499 mg/dL  Very High:  Greater than or equal to 500 mg/dL    Direct Measure HDL  Female:  Greater than or equal to 50 mg/dL   Male:  Greater than or equal to 40 mg/dL    LDL Cholesterol  Desirable:  <100mg/dL  Above Desirable:  100-129 mg/dL   Borderline High:  130-159 mg/dL   High:  160-189 mg/dL   Very High:  >= 190 mg/dL    Non HDL Cholesterol  Desirable:  130 mg/dL  Above Desirable:  130-159 mg/dL  Borderline High:  160-189 mg/dL  High:  190-219 mg/dL  Very High:  Greater than or equal to 220 mg/dL   Glucose   Result Value Ref Range    Glucose 89 70 - 99 mg/dL    Patient Fasting > 8hrs? Yes        ASSESSMENT/PLAN:       ICD-10-CM    1. Routine general medical examination at a health care facility  Z00.00 REVIEW OF HEALTH MAINTENANCE PROTOCOL ORDERS     Pap Screen with HPV - recommended age 30 - 65 years     TDAP VACCINE (Adacel, Boostrix)  [[id number]]     Lipid panel reflex to direct LDL Fasting     Glucose     Lipid panel reflex to direct LDL Fasting     Glucose   2. Cervical cancer screening  Z12.4 Pap Screen with HPV - recommended age 30 - 65 years   3. Iron deficiency anemia due to chronic blood loss  D50.0 CBC with platelets     Iron & Iron Binding Capacity     Ferritin     ferrous sulfate (FEROSUL) 325 (65 Fe) MG tablet     CBC with platelets     Iron & Iron Binding Capacity     Ferritin     CBC with platelets     Iron and iron binding capacity     Ferritin   4. Uterine leiomyoma, unspecified location  D25.9 Ob/Gyn Referral     US Pelvic Complete with Transvaginal   5. Constipation, unspecified constipation type  K59.00 TSH with free T4 reflex     TSH with free T4 reflex   6.  Encounter for screening mammogram for malignant neoplasm of breast  Z12.31 MA Screen Bilateral w/Jerod     1. Routine general medical examination at a health care facility  Reviewed VS and weight/BMI with pt   Immunizations: discussed and pt declined covid vaccine   Counseling as below   Health screenings/maintenance per orders/comments below  When applicable based on age, personal hx, fam hx, and RF- counseled on appropriate cancer screenings.     - REVIEW OF HEALTH MAINTENANCE PROTOCOL ORDERS  - Pap Screen with HPV - recommended age 30 - 65 years  - TDAP VACCINE (Adacel, Boostrix)  [[id number]]  - Lipid panel reflex to direct LDL Fasting; Future  - Glucose; Future  - Lipid panel reflex to direct LDL Fasting  - Glucose    2. Cervical cancer screening  Obtained   - Pap Screen with HPV - recommended age 30 - 65 years    3. Iron deficiency anemia due to chronic blood loss  Reviewed prior labs  Her hgb has been chronically in the 7's.   Her last labs are more than 1 year ago.   She has not been taking iron.   Discussed sx of anemia. Advised starting oral iron once daily and rechecking in 1 mo to ensure she is responding to oral supplementation. If not discussed may need iron infusions  Discussed needing to treat fibroids.     - CBC with platelets; Future  - Iron & Iron Binding Capacity; Future  - Ferritin; Future  - ferrous sulfate (FEROSUL) 325 (65 Fe) MG tablet; Take 1 tablet (325 mg) by mouth daily (with breakfast)  Dispense: 90 tablet; Refill: 1  - CBC with platelets  - Iron & Iron Binding Capacity  - Ferritin  - CBC with platelets; Future  - Iron and iron binding capacity; Future  - Ferritin; Future    4. Uterine leiomyoma, unspecified location  Uterus is enlarged and irregular on exam  Ordered pelvic US and OB/GYN consult.   - Ob/Gyn Referral; Future  - US Pelvic Complete with Transvaginal; Future    5. Constipation, unspecified constipation type  Chronic issue for a few years. Senna helpful. Miralax a 1 cap daily  "was not helpful. Question if fibroids could be causing mass effect. No bleeding sx. Could consider colonoscopy    - TSH with free T4 reflex; Future  - TSH with free T4 reflex    6. Encounter for screening mammogram for malignant neoplasm of breast  Counseled on screening recommendations. Ordered. She will schedule.   - MA Screen Bilateral w/Jerod; Future      Patient has been advised of split billing requirements and indicates understanding: Yes    COUNSELING:  Reviewed preventive health counseling, as reflected in patient instructions       Regular exercise       Healthy diet/nutrition       Family planning       Folic Acid       Osteoporosis prevention/bone health    Estimated body mass index is 36.44 kg/m  as calculated from the following:    Height as of 8/18/20: 1.57 m (5' 1.81\").    Weight as of 8/18/20: 89.8 kg (198 lb).    Weight management plan: Discussed healthy diet and exercise guidelines    She reports that she has never smoked. She has never used smokeless tobacco.      Counseling Resources:  ATP IV Guidelines  Pooled Cohorts Equation Calculator  Breast Cancer Risk Calculator  BRCA-Related Cancer Risk Assessment: FHS-7 Tool  FRAX Risk Assessment  ICSI Preventive Guidelines  Dietary Guidelines for Americans, 2010  USDA's MyPlate  ASA Prophylaxis  Lung CA Screening    THELMA Heaton [facility] MORGAN  "

## 2022-07-13 NOTE — PROGRESS NOTES
Prior to immunization administration, verified patients identity using patient s name and date of birth. Please see Immunization Activity for additional information.     Screening Questionnaire for Adult Immunization    Are you sick today?   No   Do you have allergies to medications, food, a vaccine component or latex?   No   Have you ever had a serious reaction after receiving a vaccination?   No   Do you have a long-term health problem with heart, lung, kidney, or metabolic disease (e.g., diabetes), asthma, a blood disorder, no spleen, complement component deficiency, a cochlear implant, or a spinal fluid leak?  Are you on long-term aspirin therapy?   No   Do you have cancer, leukemia, HIV/AIDS, or any other immune system problem?   No   Do you have a parent, brother, or sister with an immune system problem?   No   In the past 3 months, have you taken medications that affect  your immune system, such as prednisone, other steroids, or anticancer drugs; drugs for the treatment of rheumatoid arthritis, Crohn s disease, or psoriasis; or have you had radiation treatments?   No   Have you had a seizure, or a brain or other nervous system problem?   No   During the past year, have you received a transfusion of blood or blood    products, or been given immune (gamma) globulin or antiviral drug?   No   For women: Are you pregnant or is there a chance you could become       pregnant during the next month?   No   Have you received any vaccinations in the past 4 weeks?   No     Immunization questionnaire answers were all negative.        Patient instructed to remain in clinic for 15 minutes afterwards, and to report any adverse reaction to me immediately.       Screening performed by Maty Monique CMA on 7/13/2022 at 9:03 AM.

## 2022-07-16 LAB
BKR LAB AP GYN ADEQUACY: NORMAL
BKR LAB AP GYN INTERPRETATION: NORMAL
BKR LAB AP HPV REFLEX: NORMAL
BKR LAB AP LMP: NORMAL
BKR LAB AP PREVIOUS ABNORMAL: NORMAL
PATH REPORT.COMMENTS IMP SPEC: NORMAL
PATH REPORT.COMMENTS IMP SPEC: NORMAL
PATH REPORT.RELEVANT HX SPEC: NORMAL

## 2022-07-19 LAB
HUMAN PAPILLOMA VIRUS 16 DNA: NEGATIVE
HUMAN PAPILLOMA VIRUS 18 DNA: NEGATIVE
HUMAN PAPILLOMA VIRUS FINAL DIAGNOSIS: NORMAL
HUMAN PAPILLOMA VIRUS OTHER HR: NEGATIVE

## 2022-07-21 ENCOUNTER — ANCILLARY PROCEDURE (OUTPATIENT)
Dept: MAMMOGRAPHY | Facility: OTHER | Age: 42
End: 2022-07-21
Attending: PHYSICIAN ASSISTANT
Payer: COMMERCIAL

## 2022-07-21 DIAGNOSIS — Z12.31 ENCOUNTER FOR SCREENING MAMMOGRAM FOR MALIGNANT NEOPLASM OF BREAST: ICD-10-CM

## 2022-07-21 PROCEDURE — 77067 SCR MAMMO BI INCL CAD: CPT | Mod: TC | Performed by: RADIOLOGY

## 2022-07-21 PROCEDURE — 77063 BREAST TOMOSYNTHESIS BI: CPT | Mod: TC | Performed by: RADIOLOGY

## 2022-07-22 ENCOUNTER — ANCILLARY PROCEDURE (OUTPATIENT)
Dept: ULTRASOUND IMAGING | Facility: OTHER | Age: 42
End: 2022-07-22
Attending: PHYSICIAN ASSISTANT
Payer: COMMERCIAL

## 2022-07-22 DIAGNOSIS — D25.9 UTERINE LEIOMYOMA, UNSPECIFIED LOCATION: ICD-10-CM

## 2022-07-22 PROCEDURE — 76830 TRANSVAGINAL US NON-OB: CPT | Mod: TC | Performed by: RADIOLOGY

## 2022-07-22 PROCEDURE — 76856 US EXAM PELVIC COMPLETE: CPT | Mod: TC | Performed by: RADIOLOGY

## 2022-08-05 ENCOUNTER — OFFICE VISIT (OUTPATIENT)
Dept: OBGYN | Facility: OTHER | Age: 42
End: 2022-08-05
Attending: PHYSICIAN ASSISTANT
Payer: COMMERCIAL

## 2022-08-05 VITALS — DIASTOLIC BLOOD PRESSURE: 86 MMHG | BODY MASS INDEX: 37.6 KG/M2 | WEIGHT: 199 LBS | SYSTOLIC BLOOD PRESSURE: 135 MMHG

## 2022-08-05 DIAGNOSIS — D25.9 UTERINE LEIOMYOMA, UNSPECIFIED LOCATION: Primary | ICD-10-CM

## 2022-08-05 DIAGNOSIS — N92.0 MENORRHAGIA WITH REGULAR CYCLE: ICD-10-CM

## 2022-08-05 DIAGNOSIS — D50.0 IRON DEFICIENCY ANEMIA DUE TO CHRONIC BLOOD LOSS: ICD-10-CM

## 2022-08-05 DIAGNOSIS — N85.2 ENLARGED UTERUS: ICD-10-CM

## 2022-08-05 PROCEDURE — 99204 OFFICE O/P NEW MOD 45 MIN: CPT | Performed by: OBSTETRICS & GYNECOLOGY

## 2022-08-05 NOTE — PATIENT INSTRUCTIONS
If you have labs or imaging done, the results will automatically release in [email or web address] without an interpretation.  Your health care professional will review those results and send an interpretation with recommendations as soon as possible, but this may be 1-3 business days.    If you have any questions regarding your visit, please contact your care team.     [employer] Access Services: [phone]  [facility] CLINIC HOURS TELEPHONE NUMBER     Charla DO Devi Hickman - Certified Medical Assistant    [facility]-  [facility]-     Monday- [place]  8:00 a.m - 5:00 p.m    Tuesday- [place]  8:00 a.m - 5:00 p.m    Friday- [place]  8:00 a.m - 5:00 p.m.    Typical Surgery Day: Thursday [facility]  [address] [place] [address]  Phone: [phone]   Fax: [phone]   Imaging Scheduling- [phone]    [facility]  [address] [place] [address] [place] [address]  [phone]    [address] [address]  Phone: [phone]   Fax: [phone]   Imaging Scheduling- [phone]       **Surgeries** Our Surgery Schedulers will contact you to schedule. If you do not receive a call within 3 business days, please call [phone].    Urgent Care locations:  [facility] Monday-Friday  10 am - 8 pm  Saturday and Sunday   9 am - 5 pm  Monday-Friday   10 am - 8 pm  Saturday and Sunday   9 am - 5 pm   [phone]     If you need a medication refill, please contact your pharmacy. Please allow 3 business days for your refill to be completed.  As always, Thank you for trusting us with your healthcare needs!    see additional instructions from your care team below

## 2022-08-09 ENCOUNTER — TELEPHONE (OUTPATIENT)
Dept: OBGYN | Facility: CLINIC | Age: 42
End: 2022-08-09

## 2022-08-09 NOTE — TELEPHONE ENCOUNTER
Please call patient and let her know Dr. Flowers could do her myomectomy.  Please help her to schedule a consult appointment.  Thanks!    ~Charla Hickman, DO

## 2022-08-10 NOTE — TELEPHONE ENCOUNTER
Left message for patient to return call to clinic.  Also sent "[employer]hart message.    Sheryl Floyd, [facility]  August 10, 2022

## 2022-08-22 ENCOUNTER — LAB (OUTPATIENT)
Dept: LAB | Facility: OTHER | Age: 42
End: 2022-08-22

## 2022-08-22 DIAGNOSIS — D50.0 IRON DEFICIENCY ANEMIA DUE TO CHRONIC BLOOD LOSS: ICD-10-CM

## 2022-08-22 DIAGNOSIS — D50.0 IRON DEFICIENCY ANEMIA DUE TO CHRONIC BLOOD LOSS: Primary | ICD-10-CM

## 2022-08-22 LAB
ERYTHROCYTE [DISTWIDTH] IN BLOOD BY AUTOMATED COUNT: 25.9 % (ref 10–15)
FERRITIN SERPL-MCNC: 8 NG/ML (ref 12–150)
HCT VFR BLD AUTO: 33.7 % (ref 35–47)
HGB BLD-MCNC: 9.6 G/DL (ref 11.7–15.7)
IRON SATN MFR SERPL: 5 % (ref 15–46)
IRON SERPL-MCNC: 21 UG/DL (ref 35–180)
MCH RBC QN AUTO: 20.1 PG (ref 26.5–33)
MCHC RBC AUTO-ENTMCNC: 28.5 G/DL (ref 31.5–36.5)
MCV RBC AUTO: 71 FL (ref 78–100)
PLATELET # BLD AUTO: 254 10E3/UL (ref 150–450)
RBC # BLD AUTO: 4.77 10E6/UL (ref 3.8–5.2)
TIBC SERPL-MCNC: 397 UG/DL (ref 240–430)
WBC # BLD AUTO: 3.9 10E3/UL (ref 4–11)

## 2022-08-22 PROCEDURE — 85027 COMPLETE CBC AUTOMATED: CPT

## 2022-08-22 PROCEDURE — 82728 ASSAY OF FERRITIN: CPT

## 2022-08-22 PROCEDURE — 83550 IRON BINDING TEST: CPT

## 2022-08-22 PROCEDURE — 36415 COLL VENOUS BLD VENIPUNCTURE: CPT

## 2022-09-24 ENCOUNTER — HEALTH MAINTENANCE LETTER (OUTPATIENT)
Age: 42
End: 2022-09-24

## 2022-10-10 ENCOUNTER — LAB (OUTPATIENT)
Dept: LAB | Facility: OTHER | Age: 42
End: 2022-10-10
Payer: COMMERCIAL

## 2022-10-10 DIAGNOSIS — D50.0 IRON DEFICIENCY ANEMIA DUE TO CHRONIC BLOOD LOSS: ICD-10-CM

## 2022-10-10 LAB
ERYTHROCYTE [DISTWIDTH] IN BLOOD BY AUTOMATED COUNT: 20.2 % (ref 10–15)
FERRITIN SERPL-MCNC: 4 NG/ML (ref 12–150)
HCT VFR BLD AUTO: 31.9 % (ref 35–47)
HGB BLD-MCNC: 9.5 G/DL (ref 11.7–15.7)
IRON SATN MFR SERPL: 3 % (ref 15–46)
IRON SERPL-MCNC: 13 UG/DL (ref 35–180)
MCH RBC QN AUTO: 20.8 PG (ref 26.5–33)
MCHC RBC AUTO-ENTMCNC: 29.8 G/DL (ref 31.5–36.5)
MCV RBC AUTO: 70 FL (ref 78–100)
PLATELET # BLD AUTO: 283 10E3/UL (ref 150–450)
RBC # BLD AUTO: 4.56 10E6/UL (ref 3.8–5.2)
TIBC SERPL-MCNC: 406 UG/DL (ref 240–430)
WBC # BLD AUTO: 3.8 10E3/UL (ref 4–11)

## 2022-10-10 PROCEDURE — 85027 COMPLETE CBC AUTOMATED: CPT

## 2022-10-10 PROCEDURE — 83550 IRON BINDING TEST: CPT

## 2022-10-10 PROCEDURE — 82728 ASSAY OF FERRITIN: CPT

## 2022-10-10 PROCEDURE — 36415 COLL VENOUS BLD VENIPUNCTURE: CPT

## 2022-10-12 ENCOUNTER — TELEPHONE (OUTPATIENT)
Dept: FAMILY MEDICINE | Facility: CLINIC | Age: 42
End: 2022-10-12

## 2022-10-12 DIAGNOSIS — N85.2 ENLARGED UTERUS: Primary | ICD-10-CM

## 2022-10-12 DIAGNOSIS — D50.0 IRON DEFICIENCY ANEMIA DUE TO CHRONIC BLOOD LOSS: ICD-10-CM

## 2022-10-12 DIAGNOSIS — N92.0 MENORRHAGIA WITH REGULAR CYCLE: ICD-10-CM

## 2022-10-12 RX ORDER — DIPHENHYDRAMINE HYDROCHLORIDE 50 MG/ML
50 INJECTION INTRAMUSCULAR; INTRAVENOUS
Status: CANCELLED
Start: 2022-10-12

## 2022-10-12 RX ORDER — ALBUTEROL SULFATE 90 UG/1
1-2 AEROSOL, METERED RESPIRATORY (INHALATION)
Status: CANCELLED
Start: 2022-10-12

## 2022-10-12 RX ORDER — EPINEPHRINE 1 MG/ML
0.3 INJECTION, SOLUTION, CONCENTRATE INTRAVENOUS EVERY 5 MIN PRN
Status: CANCELLED | OUTPATIENT
Start: 2022-10-12

## 2022-10-12 RX ORDER — HEPARIN SODIUM,PORCINE 10 UNIT/ML
5 VIAL (ML) INTRAVENOUS
Status: CANCELLED | OUTPATIENT
Start: 2022-10-12

## 2022-10-12 RX ORDER — HEPARIN SODIUM (PORCINE) LOCK FLUSH IV SOLN 100 UNIT/ML 100 UNIT/ML
5 SOLUTION INTRAVENOUS
Status: CANCELLED | OUTPATIENT
Start: 2022-10-12

## 2022-10-12 RX ORDER — METHYLPREDNISOLONE SODIUM SUCCINATE 125 MG/2ML
125 INJECTION, POWDER, LYOPHILIZED, FOR SOLUTION INTRAMUSCULAR; INTRAVENOUS
Status: CANCELLED
Start: 2022-10-12

## 2022-10-12 RX ORDER — MEPERIDINE HYDROCHLORIDE 25 MG/ML
25 INJECTION INTRAMUSCULAR; INTRAVENOUS; SUBCUTANEOUS EVERY 30 MIN PRN
Status: CANCELLED | OUTPATIENT
Start: 2022-10-12

## 2022-10-12 RX ORDER — ALBUTEROL SULFATE 0.83 MG/ML
2.5 SOLUTION RESPIRATORY (INHALATION)
Status: CANCELLED | OUTPATIENT
Start: 2022-10-12

## 2022-10-12 NOTE — TELEPHONE ENCOUNTER
LM on pt phone to return call - Re: anemia labs and consideration of iron infusions.   Ruth Garcia PA-C

## 2022-10-13 NOTE — TELEPHONE ENCOUNTER
Called pt:  She had been taking iron inconsistently (less than 50% of days in the last month), also just had her period at time of labs. Hgb was stable compared to prior at 9.4 g/dl but not improving. She tolerates oral iron ok. She does not think she wants to pursue iron infusion (venofer) at this time. She will work on taking iron daily. Has standing orders for cbc, iron studies, ferritin. Consider recheck in 6-8 weeks.     Gave her 's name w/OB/GYN to discuss possible myomectomy. She will call to schedule.     Ruth Garcia PA-C

## 2022-10-14 ENCOUNTER — TELEPHONE (OUTPATIENT)
Dept: INFUSION THERAPY | Facility: CLINIC | Age: 42
End: 2022-10-14

## 2022-10-14 NOTE — TELEPHONE ENCOUNTER
Patient stated she is no interested in scheduling iron infusions at this time. She will call us if she changes her mind.     Laine

## 2023-01-24 ENCOUNTER — TELEPHONE (OUTPATIENT)
Dept: INFUSION THERAPY | Facility: CLINIC | Age: 43
End: 2023-01-24
Payer: COMMERCIAL

## 2023-01-24 NOTE — TELEPHONE ENCOUNTER
1/24 Spoke to patient and she stated that she is getting labs done on 2/10 and she will see what they are and let us know if she wants to schedule Venofer Infusions

## 2023-06-30 ENCOUNTER — TRANSFERRED RECORDS (OUTPATIENT)
Dept: HEALTH INFORMATION MANAGEMENT | Facility: CLINIC | Age: 43
End: 2023-06-30
Payer: COMMERCIAL

## 2023-10-14 ENCOUNTER — HEALTH MAINTENANCE LETTER (OUTPATIENT)
Age: 43
End: 2023-10-14

## 2023-12-04 ENCOUNTER — MYC MEDICAL ADVICE (OUTPATIENT)
Dept: FAMILY MEDICINE | Facility: OTHER | Age: 43
End: 2023-12-04
Payer: COMMERCIAL

## 2024-01-23 ENCOUNTER — OFFICE VISIT (OUTPATIENT)
Dept: FAMILY MEDICINE | Facility: OTHER | Age: 44
End: 2024-01-23
Payer: COMMERCIAL

## 2024-01-23 ENCOUNTER — ANCILLARY PROCEDURE (OUTPATIENT)
Dept: MAMMOGRAPHY | Facility: OTHER | Age: 44
End: 2024-01-23
Payer: COMMERCIAL

## 2024-01-23 VITALS
SYSTOLIC BLOOD PRESSURE: 122 MMHG | OXYGEN SATURATION: 98 % | HEIGHT: 61 IN | BODY MASS INDEX: 33.61 KG/M2 | TEMPERATURE: 97.3 F | DIASTOLIC BLOOD PRESSURE: 88 MMHG | WEIGHT: 178 LBS | RESPIRATION RATE: 16 BRPM | HEART RATE: 82 BPM

## 2024-01-23 DIAGNOSIS — E66.9 OBESITY (BMI 30-39.9): ICD-10-CM

## 2024-01-23 DIAGNOSIS — D50.0 IRON DEFICIENCY ANEMIA DUE TO CHRONIC BLOOD LOSS: ICD-10-CM

## 2024-01-23 DIAGNOSIS — Z12.31 VISIT FOR SCREENING MAMMOGRAM: ICD-10-CM

## 2024-01-23 DIAGNOSIS — L21.0 DANDRUFF: ICD-10-CM

## 2024-01-23 DIAGNOSIS — D25.9 UTERINE LEIOMYOMA, UNSPECIFIED LOCATION: ICD-10-CM

## 2024-01-23 DIAGNOSIS — Z00.00 ROUTINE GENERAL MEDICAL EXAMINATION AT A HEALTH CARE FACILITY: Primary | ICD-10-CM

## 2024-01-23 LAB
CHOLEST SERPL-MCNC: 189 MG/DL
ERYTHROCYTE [DISTWIDTH] IN BLOOD BY AUTOMATED COUNT: 17.4 % (ref 10–15)
FASTING STATUS PATIENT QL REPORTED: YES
FERRITIN SERPL-MCNC: 24 NG/ML (ref 6–175)
HBA1C MFR BLD: 5.5 % (ref 0–5.6)
HCT VFR BLD AUTO: 36.7 % (ref 35–47)
HDLC SERPL-MCNC: 56 MG/DL
HGB BLD-MCNC: 11.6 G/DL (ref 11.7–15.7)
LDLC SERPL CALC-MCNC: 122 MG/DL
MCH RBC QN AUTO: 24.4 PG (ref 26.5–33)
MCHC RBC AUTO-ENTMCNC: 31.6 G/DL (ref 31.5–36.5)
MCV RBC AUTO: 77 FL (ref 78–100)
NONHDLC SERPL-MCNC: 133 MG/DL
PLATELET # BLD AUTO: 236 10E3/UL (ref 150–450)
RBC # BLD AUTO: 4.75 10E6/UL (ref 3.8–5.2)
TRIGL SERPL-MCNC: 56 MG/DL
WBC # BLD AUTO: 3.7 10E3/UL (ref 4–11)

## 2024-01-23 PROCEDURE — 80061 LIPID PANEL: CPT | Performed by: STUDENT IN AN ORGANIZED HEALTH CARE EDUCATION/TRAINING PROGRAM

## 2024-01-23 PROCEDURE — 82728 ASSAY OF FERRITIN: CPT | Performed by: STUDENT IN AN ORGANIZED HEALTH CARE EDUCATION/TRAINING PROGRAM

## 2024-01-23 PROCEDURE — 77063 BREAST TOMOSYNTHESIS BI: CPT | Mod: TC | Performed by: RADIOLOGY

## 2024-01-23 PROCEDURE — 77067 SCR MAMMO BI INCL CAD: CPT | Mod: TC | Performed by: RADIOLOGY

## 2024-01-23 PROCEDURE — 83036 HEMOGLOBIN GLYCOSYLATED A1C: CPT | Performed by: STUDENT IN AN ORGANIZED HEALTH CARE EDUCATION/TRAINING PROGRAM

## 2024-01-23 PROCEDURE — 85027 COMPLETE CBC AUTOMATED: CPT | Performed by: STUDENT IN AN ORGANIZED HEALTH CARE EDUCATION/TRAINING PROGRAM

## 2024-01-23 PROCEDURE — 99213 OFFICE O/P EST LOW 20 MIN: CPT | Mod: 25 | Performed by: STUDENT IN AN ORGANIZED HEALTH CARE EDUCATION/TRAINING PROGRAM

## 2024-01-23 PROCEDURE — 99396 PREV VISIT EST AGE 40-64: CPT | Performed by: STUDENT IN AN ORGANIZED HEALTH CARE EDUCATION/TRAINING PROGRAM

## 2024-01-23 PROCEDURE — 36415 COLL VENOUS BLD VENIPUNCTURE: CPT | Performed by: STUDENT IN AN ORGANIZED HEALTH CARE EDUCATION/TRAINING PROGRAM

## 2024-01-23 RX ORDER — KETOCONAZOLE 20 MG/ML
SHAMPOO TOPICAL DAILY PRN
Qty: 120 ML | Refills: 3 | Status: SHIPPED | OUTPATIENT
Start: 2024-01-23

## 2024-01-23 ASSESSMENT — ENCOUNTER SYMPTOMS
MYALGIAS: 0
SHORTNESS OF BREATH: 0
FEVER: 0
NERVOUS/ANXIOUS: 0
BREAST MASS: 0
DYSURIA: 0
CHILLS: 0
ARTHRALGIAS: 0
DIARRHEA: 0
WEAKNESS: 0
COUGH: 0
HEMATOCHEZIA: 0
DIZZINESS: 0
ABDOMINAL PAIN: 0
NAUSEA: 0
JOINT SWELLING: 0
EYE PAIN: 0
PALPITATIONS: 0
CONSTIPATION: 0
HEADACHES: 0
SORE THROAT: 0
PARESTHESIAS: 0
HEMATURIA: 0
HEARTBURN: 0
FREQUENCY: 0

## 2024-01-23 ASSESSMENT — PAIN SCALES - GENERAL: PAINLEVEL: NO PAIN (0)

## 2024-01-23 NOTE — PROGRESS NOTES
Preventive Care Visit  [facility]  FINA WILL MD, Family Medicine  2024       SUBJECTIVE:   Kashmir is a 43 year old, presenting for the following:  Physical        2024     9:17 AM   Additional Questions   Roomed by karyn   Accompanied by alone         2024     9:17 AM   Patient Reported Additional Medications   Patient reports taking the following new medications wegovy, fluocinonide 0.05%     Healthy Habits:     Getting at least 3 servings of Calcium per day:  Yes    Bi-annual eye exam:  Yes    Dental care twice a year:  NO    Sleep apnea or symptoms of sleep apnea:  None    Diet:  Regular (no restrictions)    Frequency of exercise:  None    Taking medications regularly:  Yes    Medication side effects:  None    Additional concerns today:  Yes      Today's PHQ-2 Score:       2024     9:09 AM   PHQ-2 (  [employer])   Q1: Little interest or pleasure in doing things 0   Q2: Feeling down, depressed or hopeless 0   PHQ-2 Score 0   Q1: Little interest or pleasure in doing things Not at all   Q2: Feeling down, depressed or hopeless Not at all   PHQ-2 Score 0           Social History     Tobacco Use    Smoking status: Never    Smokeless tobacco: Never   Substance Use Topics    Alcohol use: Yes     Comment: occ. a few per month             2024     9:09 AM   Alcohol Use   Prescreen: >3 drinks/day or >7 drinks/week? No     Reviewed orders with patient.  Reviewed health maintenance and updated orders accordingly - Yes  Lab work is in process    Breast Cancer Screenin/13/2022     1:49 AM 2022    10:34 AM   Breast CA Risk Assessment (FHS-7)   Do you have a family history of breast, colon, or ovarian cancer? No / Unknown No / Unknown           Pertinent mammograms are reviewed under the imaging tab.    History of abnormal Pap smear: NO - age 30-65 PAP every 5 years with negative HPV co-testing recommended      Latest Ref Rng & Units 2022      "8:19 AM   PAP / HPV   PAP  Negative for Intraepithelial Lesion or Malignancy (NILM)    HPV 16 DNA Negative Negative    HPV 18 DNA Negative Negative    Other HR HPV Negative Negative      Reviewed and updated as needed this visit by clinical staff   Tobacco  Allergies  Meds              Reviewed and updated as needed this visit by Provider   Tobacco   Meds                Review of Systems   Constitutional:  Negative for chills and fever.   HENT:  Negative for congestion, ear pain, hearing loss and sore throat.    Eyes:  Negative for pain and visual disturbance.   Respiratory:  Negative for cough and shortness of breath.    Cardiovascular:  Negative for chest pain and palpitations.   Gastrointestinal:  Negative for abdominal pain, constipation, diarrhea and nausea.   Genitourinary:  Negative for dysuria, frequency, genital sores, hematuria, pelvic pain, urgency, vaginal bleeding and vaginal discharge.   Musculoskeletal:  Negative for arthralgias, joint swelling and myalgias.   Skin:  Negative for rash.   Neurological:  Negative for dizziness, weakness and headaches.   Psychiatric/Behavioral:  The patient is not nervous/anxious.          OBJECTIVE:   /88   Pulse 82   Temp 97.3  F (36.3  C) (Temporal)   Resp 16   Ht 1.559 m (5' 1.38\")   Wt 80.7 kg (178 lb)   LMP 12/28/2023   SpO2 98%   BMI 33.22 kg/m     Estimated body mass index is 33.22 kg/m  as calculated from the following:    Height as of this encounter: 1.559 m (5' 1.38\").    Weight as of this encounter: 80.7 kg (178 lb).  Physical Exam  Vitals and nursing note reviewed.   Constitutional:       General: She is not in acute distress.     Appearance: Normal appearance. She is not ill-appearing, toxic-appearing or diaphoretic.   HENT:      Head: Normocephalic and atraumatic.      Right Ear: Tympanic membrane, ear canal and external ear normal. There is no impacted cerumen.      Left Ear: Tympanic membrane, ear canal and external ear normal. There " is no impacted cerumen.      Nose: Nose normal. No congestion or rhinorrhea.      Mouth/Throat:      Mouth: Mucous membranes are moist.      Pharynx: Oropharynx is clear. No oropharyngeal exudate or posterior oropharyngeal erythema.   Eyes:      General: No scleral icterus.        Right eye: No discharge.         Left eye: No discharge.      Extraocular Movements: Extraocular movements intact.      Conjunctiva/sclera: Conjunctivae normal.      Pupils: Pupils are equal, round, and reactive to light.   Cardiovascular:      Rate and Rhythm: Normal rate and regular rhythm.      Heart sounds: No murmur heard.  Pulmonary:      Effort: Pulmonary effort is normal. No respiratory distress.      Breath sounds: Normal breath sounds. No stridor.   Abdominal:      General: There is no distension.      Palpations: Abdomen is soft.      Tenderness: There is no abdominal tenderness.      Hernia: No hernia is present.      Comments: Previous healed surgical incisions from  section   Musculoskeletal:         General: Normal range of motion.      Cervical back: Normal range of motion.      Right lower leg: No edema.      Left lower leg: No edema.   Lymphadenopathy:      Cervical: No cervical adenopathy.   Skin:     General: Skin is warm.   Neurological:      Mental Status: She is alert.   Psychiatric:         Mood and Affect: Mood normal.         Behavior: Behavior normal.         Thought Content: Thought content normal.         Judgment: Judgment normal.         ASSESSMENT/PLAN:   Routine general medical examination at a health care facility  - Lipid panel reflex to direct LDL Fasting  Health maintenance reviewed and updated.    Dandruff  Has been an issue for quite some time, currently stable, has utilized fluocinonide in the past which has kept at bay but once stopped does return.  Will have her trial ketoconazole instead  - ketoconazole (NIZORAL) 2 % external shampoo  Dispense: 120 mL; Refill: 3    Obesity (BMI  30-39.9)  Had previous prescription of Wegovy at 1.7 mg weekly.  She wishes to go up on it.  She has lost approximately 36 pounds since initiation.  [place].  We did discuss about potential side effects as well as availability of medication at pharmacy.  She does understand.  - Hemoglobin A1c  - Semaglutide-Weight Management (WEGOVY) 2.4 MG/0.75ML pen  Dispense: 9 mL; Refill: 0    Uterine leiomyoma, unspecified location  Iron deficiency anemia due to chronic blood loss  Previously followed up with OB/GYN, status post myomectomy.  Bleeding has essentially resolved since.  Her fatigue and symptoms overall have improved/resolved as well.  Will do follow-up lab test just to be sure  - CBC with platelets  - Ferritin    Pterygium-likely  Of the right eye, medial aspect.  Likely early/developing pterygium.  Has not affected her vision, did offer ophthalmology referral if she likes but wishes to hold off for now.    Counseling  Reviewed preventive health counseling, as reflected in patient instructions       Regular exercise       Healthy diet/nutrition       Vision screening       Colorectal Cancer Screening       Consider Hep C screening for all patients one time for ages 18-79 years       Advance Care Planning        She reports that she has never smoked. She has never used smokeless tobacco.          Signed Electronically by: FINA WILL MD

## 2024-01-23 NOTE — RESULT ENCOUNTER NOTE
Kashmir,    Here are the results of your recent clinic visit    Here are your most recent results from your clinic visit.    Cholesterol levels are elevated, not to the point where I would recommended any prescription medication. I would encourage healthy lifestyle choices including improved diet and more exercise.  Any exercise is reasonable as long as you are elevating your heart rate and breaking a sweat for up to 20 to 30 minutes/day.  In regards to dietary changes cut down on processed food, eliminate added sugar, and cut down on refined carbohydrates (white bread, pastries, cereals, etc...).  It is best to home-cook your meals and if you need a snack in between meals consider fruits and vegetables.    Anemia levels are significantly improved.  There is still slightly low although, you could consider more iron rich foods within your diet but you also could consider iron supplementation along with vitamin C to increase the levels more so.    All other labs normal/negative.         If you have any questions feel free to call the clinic at [phone].      Thank you,    Bertin hSen MD

## 2024-01-23 NOTE — PATIENT INSTRUCTIONS
Preventive Health Recommendations  Female Ages 40 to 49    Yearly exam:   See your health care provider every year in order to  Review health changes.   Discuss preventive care.    Review your medicines if your doctor prescribed any.    Get a Pap test every three years (unless you have an abnormal result and your provider advises testing more often).    If you get Pap tests with HPV test, you only need to test every 5 years, unless you have an abnormal result. You do not need a Pap test if your uterus was removed (hysterectomy) and you have not had cancer.    You should be tested each year for STDs (sexually transmitted diseases), if you're at risk.   Ask your doctor if you should have a mammogram.    Have a colonoscopy (test for colon cancer) beginning at age 45.  Ask your provider about other options like a yearly FIT test or Cologuard test every 3 years (stool tests)      Have a cholesterol test every 5 years.     Have a diabetes test (fasting glucose) after age 45. If you are at risk for diabetes, you should have this test every 3 years.    Shots: Get a flu shot each year. Get a tetanus shot every 10 years.     Nutrition:   Eat at least 5 servings of fruits and vegetables each day.  Eat whole-grain bread, whole-wheat pasta and brown rice instead of white grains and rice.  Get adequate Calcium and Vitamin D.      Lifestyle  Exercise at least 150 minutes a week (an average of 30 minutes a day, 5 days a week). This will help you control your weight and prevent disease.  Limit alcohol to one drink per day.  No smoking.   Wear sunscreen to prevent skin cancer.  See your dentist every six months for an exam and cleaning.              Iron is found in a few types of foods. Good sources include:   Red meat, poultry, fish, eggs  Dried fruits (especially raisins, prunes, figs)  Legumes such as dried beans, peas, and lentils  Nuts  Breads and cereals with iron added (iron fortified)  Cream of wheat  Blackstrap  molasses  Spinach  Foods cooked in cast-iron pans. This is especially true of acidic foods, such as tomatoes and marii.  Liver and liverwurst    If your provider has you on iron supplementation (medication), don't forget to take this with Vitamin C (such as 500 mg Vit C tablets or taking iron with some orange juice)  Gastrointestinal side effects are common with iron supplements (nausea, upset stomach, change in stool patterns). If this is bothersome please let your provider know and they may adjust your medication    Reviewed: 2023

## 2024-05-09 DIAGNOSIS — E66.9 OBESITY (BMI 30-39.9): ICD-10-CM

## 2024-05-10 RX ORDER — SEMAGLUTIDE 2.4 MG/.75ML
INJECTION, SOLUTION SUBCUTANEOUS
Qty: 12 ML | Refills: 0 | Status: SHIPPED | OUTPATIENT
Start: 2024-05-10 | End: 2024-05-24

## 2024-05-15 NOTE — PROGRESS NOTES
Subjective  41 year old non-pregnant female presents today complaining of menorrhagia.  Patient states she has been dealing with heavy menses for many years.  She bleeds for 5 days.  The first 3 days are very heavy.  She uses pads and changes them every 2 hours.  She has soiled her sheets.  She does pass large clots.  Fatigue during her menses.  No problems urinating.  Some constipation.  She uses fiber and Senna.  Patient is sexually active.  No dyspareunia.  No vaginal spotting after.  1  and 1 c section.  She has had anemia with her last hemoglobin in July being 7.7.  She is taking additional iron supplements.  Normal TSH in July.  We reviewed her ultrasound results in detail.   We discussed uterine fibroids in detail.  I recommended a hysterectomy given her anemia and enlarged uterus with multiple fibroids.  Patient is still very much wanting to get pregnant again.  We discussed the challenge asso with this given the enlarged uterus and fibroids.  We discussed other options for her fibroids.  At the end of our conversation she stated she does not want a hysterectomy.  She is wanting a myomectomy.  I will reach out and discuss with this with a partner who does them.      I personally reviewed the ultrasound and the findings showed an enlarged uterus with multiple fibroids.          ROS: 10 point ROS neg other than the symptoms noted above in the HPI.  Past Medical History:   Diagnosis Date     Ectopic pregnancy      Ovarian cyst      Past Surgical History:   Procedure Laterality Date      SECTION       salpingectomy       Family History   Problem Relation Age of Onset     Hypertension Mother      Heart Disease Father      Social History     Tobacco Use     Smoking status: Never Smoker     Smokeless tobacco: Never Used   Substance Use Topics     Alcohol use: Yes     Comment: occ. a few per month         Objective  Vitals: /86 (BP Location: Right arm, Cuff Size: Adult Regular)   Wt 90.3 kg (199  lb)   LMP 07/25/2022   Breastfeeding No   BMI 37.60 kg/m    BMI= Body mass index is 37.6 kg/m .    General appearance=well developed, well-nourished female  Gait=normal  Psych=mood is stable, alert and oriented x3  Abd=soft, Nontender/nondistended, no masses, no signs of hernias, no evidence of hepatosplenomegaly  PELVIC:    External genitalia: normal without lesions or masses  Urethral meatus: no lesions or prolapse noted, normal size  Urethra: no masses, non tender  Bladder: non tender, no fullness  Vagina: normal mucosa and rugae, no discharge.  Cervix: normal without lesion, no cervical motion tenderness, healthy, multiparous  Uterus: large, mobile, nontender.  Adnexa: non tender, without masses  Rectal: deffered  Ext=no clubbing or cyanosis, no swelling      Assessment  1.)  Menorrhagia  2.)  Uterine fibroids  3.)  Enlarged uterus  4.)  Desire for pregnancy  5.)  Anemia      Plan  1.)  Follow-up with partner to discuss myomectomy  2.)  Continue iron supplements      One undiagnosed new problem with uncertain prognosis and interpretation of ultrasound findings ordered by a different provider.  Nursing notes read and reviewed    Charla Hickman DO     [FreeTextEntry2] : [facility] DOI 1/11/2023

## 2024-05-24 DIAGNOSIS — E66.9 OBESITY (BMI 30-39.9): ICD-10-CM

## 2024-05-24 RX ORDER — SEMAGLUTIDE 2.4 MG/.75ML
INJECTION, SOLUTION SUBCUTANEOUS
Qty: 12 ML | Refills: 2 | Status: SHIPPED | OUTPATIENT
Start: 2024-05-24 | End: 2024-08-30

## 2024-08-30 ENCOUNTER — OFFICE VISIT (OUTPATIENT)
Dept: FAMILY MEDICINE | Facility: OTHER | Age: 44
End: 2024-08-30
Payer: COMMERCIAL

## 2024-08-30 VITALS
OXYGEN SATURATION: 97 % | WEIGHT: 171 LBS | HEART RATE: 80 BPM | BODY MASS INDEX: 32.28 KG/M2 | RESPIRATION RATE: 16 BRPM | HEIGHT: 61 IN | SYSTOLIC BLOOD PRESSURE: 134 MMHG | TEMPERATURE: 97.7 F | DIASTOLIC BLOOD PRESSURE: 84 MMHG

## 2024-08-30 DIAGNOSIS — E66.9 OBESITY (BMI 30-39.9): Primary | ICD-10-CM

## 2024-08-30 DIAGNOSIS — D50.0 IRON DEFICIENCY ANEMIA DUE TO CHRONIC BLOOD LOSS: ICD-10-CM

## 2024-08-30 DIAGNOSIS — N89.8 VAGINAL ITCHING: ICD-10-CM

## 2024-08-30 LAB
CLUE CELLS: ABNORMAL
ERYTHROCYTE [DISTWIDTH] IN BLOOD BY AUTOMATED COUNT: 14.4 % (ref 10–15)
FERRITIN SERPL-MCNC: 8 NG/ML (ref 6–175)
HCT VFR BLD AUTO: 32.6 % (ref 35–47)
HGB BLD-MCNC: 10.2 G/DL (ref 11.7–15.7)
IRON BINDING CAPACITY (ROCHE): 347 UG/DL (ref 240–430)
IRON SATN MFR SERPL: 10 % (ref 15–46)
IRON SERPL-MCNC: 33 UG/DL (ref 37–145)
MCH RBC QN AUTO: 25.2 PG (ref 26.5–33)
MCHC RBC AUTO-ENTMCNC: 31.3 G/DL (ref 31.5–36.5)
MCV RBC AUTO: 81 FL (ref 78–100)
PLATELET # BLD AUTO: 238 10E3/UL (ref 150–450)
RBC # BLD AUTO: 4.04 10E6/UL (ref 3.8–5.2)
TRICHOMONAS, WET PREP: ABNORMAL
WBC # BLD AUTO: 3.4 10E3/UL (ref 4–11)
WBC'S/HIGH POWER FIELD, WET PREP: ABNORMAL
YEAST, WET PREP: ABNORMAL

## 2024-08-30 PROCEDURE — G2211 COMPLEX E/M VISIT ADD ON: HCPCS | Performed by: STUDENT IN AN ORGANIZED HEALTH CARE EDUCATION/TRAINING PROGRAM

## 2024-08-30 PROCEDURE — 36415 COLL VENOUS BLD VENIPUNCTURE: CPT | Performed by: STUDENT IN AN ORGANIZED HEALTH CARE EDUCATION/TRAINING PROGRAM

## 2024-08-30 PROCEDURE — 82728 ASSAY OF FERRITIN: CPT | Performed by: STUDENT IN AN ORGANIZED HEALTH CARE EDUCATION/TRAINING PROGRAM

## 2024-08-30 PROCEDURE — 83550 IRON BINDING TEST: CPT | Performed by: STUDENT IN AN ORGANIZED HEALTH CARE EDUCATION/TRAINING PROGRAM

## 2024-08-30 PROCEDURE — 87210 SMEAR WET MOUNT SALINE/INK: CPT | Performed by: STUDENT IN AN ORGANIZED HEALTH CARE EDUCATION/TRAINING PROGRAM

## 2024-08-30 PROCEDURE — 83540 ASSAY OF IRON: CPT | Performed by: STUDENT IN AN ORGANIZED HEALTH CARE EDUCATION/TRAINING PROGRAM

## 2024-08-30 PROCEDURE — 99214 OFFICE O/P EST MOD 30 MIN: CPT | Performed by: STUDENT IN AN ORGANIZED HEALTH CARE EDUCATION/TRAINING PROGRAM

## 2024-08-30 PROCEDURE — 85027 COMPLETE CBC AUTOMATED: CPT | Performed by: STUDENT IN AN ORGANIZED HEALTH CARE EDUCATION/TRAINING PROGRAM

## 2024-08-30 RX ORDER — SEMAGLUTIDE 2.4 MG/.75ML
2.4 INJECTION, SOLUTION SUBCUTANEOUS WEEKLY
Qty: 12 ML | Refills: 3 | Status: SHIPPED | OUTPATIENT
Start: 2024-08-30

## 2024-08-30 ASSESSMENT — PAIN SCALES - GENERAL: PAINLEVEL: NO PAIN (0)

## 2024-08-30 NOTE — PROGRESS NOTES
Assessment & Plan     Obesity (BMI 30-39.9)  Continues to be successful on semaglutide, has lost an additional 7 to 8 pounds since previous visit, overall weight loss is at proximately 40 pounds.  Patient does note some constipation side effects, this was present before her semaglutide initiation but slightly worse as of now.  She does utilize conservative and symptomatic over-the-counter cares, overall not too bothersome for her.  - Semaglutide-Weight Management (WEGOVY) 2.4 MG/0.75ML pen; Inject 2.4 mg subcutaneously once a week.    Vaginal itching  New as of recently for the last week, slight discharge as well.  Denies any dysuria, frequency of urination or urgency.  - Wet prep - lab collect    Iron deficiency anemia due to chronic blood loss  Has been improving over the last few years, plan to recheck today, has not been very diligent about taking OTC supplementation.  Iron rich foods were discussed.  Vitamin C along with supplementation also discussed.  - Iron and iron binding capacity  - Ferritin  - CBC with platelets    The longitudinal plan of care for the diagnosis(es)/condition(s) as documented were addressed during this visit. Due to the added complexity in care, I will continue to support Kashmir in the subsequent management and with ongoing continuity of care.    Domi Marlow is a 43 year old, presenting for the following health issues:  Recheck Medication      8/30/2024     7:49 AM   Additional Questions   Roomed by Ldai     History of Present Illness       Reason for visit:  Routine checkup    She eats 4 or more servings of fruits and vegetables daily.She consumes 0 sweetened beverage(s) daily.She exercises with enough effort to increase her heart rate 30 to 60 minutes per day.  She exercises with enough effort to increase her heart rate 3 or less days per week.   She is taking medications regularly.         Medication Followup of Wegovy  Taking Medication as prescribed: yes -  "sometimes skipping a week  Side Effects:  None  Medication Helping Symptoms:  yes        Review of Systems  Constitutional, HEENT, cardiovascular, pulmonary, gi and gu systems are negative, except as otherwise noted.      Objective    /84   Pulse 80   Temp 97.7  F (36.5  C) (Temporal)   Resp 16   Ht 1.559 m (5' 1.38\")   Wt 77.6 kg (171 lb)   LMP 08/16/2024 (Approximate)   SpO2 97%   BMI 31.91 kg/m    Body mass index is 31.91 kg/m .  Physical Exam   GENERAL: alert and no distress  EYES: Eyes grossly normal to inspection, PERRL and conjunctivae and sclerae normal  HENT: ear canals and TM's normal, nose and mouth without ulcers or lesions  NECK: no adenopathy, no asymmetry, masses, or scars  RESP: lungs clear to auscultation - no rales, rhonchi or wheezes  CV: regular rate and rhythm, normal S1 S2, no S3 or S4, no murmur, click or rub, no peripheral edema  ABDOMEN: soft, nontender, no hepatosplenomegaly, no masses and bowel sounds normal  MS: no gross musculoskeletal defects noted, no edema  SKIN: no suspicious lesions or rashes  NEURO: Normal strength and tone, mentation intact and speech normal  PSYCH: mentation appears normal, affect normal/bright            Signed Electronically by: FINA WILL MD    "

## 2024-08-30 NOTE — PATIENT INSTRUCTIONS
Iron is found in a few types of foods. Good sources include:   Red meat, poultry, fish, eggs  Dried fruits (especially raisins, prunes, figs)  Legumes such as dried beans, peas, and lentils  Nuts  Breads and cereals with iron added (iron fortified)  Cream of wheat  Blackstrap molasses  Spinach  Foods cooked in cast-iron pans. This is especially true of acidic foods, such as tomatoes and marii.  Liver and liverwurst    If your provider has you on iron supplementation (medication), don't forget to take this with Vitamin C (such as 500 mg Vit C tablets or taking iron with some orange juice)  Gastrointestinal side effects are common with iron supplements (nausea, upset stomach, change in stool patterns). If this is bothersome please let your provider know and they may adjust your medication    Reviewed: 2023

## 2024-09-03 NOTE — RESULT ENCOUNTER NOTE
Kashmir,    Here are your recent lab results    Iron levels are low once again. I would recommend your supplementation daily to get these levels up. I would also recommend iron rich foods in your diet as well.     Iron is found in a few types of foods. Good sources include:   Red meat, poultry, fish, eggs  Dried fruits (especially raisins, prunes, figs)  Legumes such as dried beans, peas, and lentils  Nuts  Breads and cereals with iron added (iron fortified)  Cream of wheat  Blackstrap molasses  Spinach  Foods cooked in cast-iron pans. This is especially true of acidic foods, such as tomatoes and marii.  Liver and liverwurst    If your provider has you on iron supplementation (medication), don't forget to take this with Vitamin C (such as 500 mg Vit C tablets or taking iron with some orange juice)  Gastrointestinal side effects are common with iron supplements (nausea, upset stomach, change in stool patterns). If this is bothersome please let your provider know and they may adjust your medication        If you have any questions feel free to call the clinic at [phone].      Thank you,    Bertin Shen MD

## 2025-01-27 ENCOUNTER — MYC REFILL (OUTPATIENT)
Dept: FAMILY MEDICINE | Facility: OTHER | Age: 45
End: 2025-01-27
Payer: COMMERCIAL

## 2025-01-27 DIAGNOSIS — E66.9 OBESITY (BMI 30-39.9): ICD-10-CM

## 2025-01-27 RX ORDER — SEMAGLUTIDE 2.4 MG/.75ML
2.4 INJECTION, SOLUTION SUBCUTANEOUS WEEKLY
Qty: 12 ML | Refills: 3 | Status: CANCELLED | OUTPATIENT
Start: 2025-01-27

## 2025-01-28 NOTE — TELEPHONE ENCOUNTER
Bertin Shen MD to Me  [facility]   [place]    1/28/25 10:19 AM  Being off of the medication for that long we will need to ramp up the dosage to prevent and adverse effects. 0.25 mg weekly sent for the next 1 month then 0.5 mg weekly for the month sent thereafter.      Thank you,    Bertin Shen MD    [address] [place] [address]  Ph: [phone]  Fax:[phone]

## 2025-01-30 ENCOUNTER — ANCILLARY PROCEDURE (OUTPATIENT)
Dept: MAMMOGRAPHY | Facility: OTHER | Age: 45
End: 2025-01-30
Attending: STUDENT IN AN ORGANIZED HEALTH CARE EDUCATION/TRAINING PROGRAM
Payer: COMMERCIAL

## 2025-01-30 DIAGNOSIS — Z12.31 VISIT FOR SCREENING MAMMOGRAM: ICD-10-CM

## 2025-02-12 ENCOUNTER — TELEPHONE (OUTPATIENT)
Dept: FAMILY MEDICINE | Facility: OTHER | Age: 45
End: 2025-02-12
Payer: COMMERCIAL

## 2025-02-12 NOTE — TELEPHONE ENCOUNTER
semaglutide-weight management (WEGOVY) 0.25 MG/0.5ML pen     Prior Authorization Retail Medication Request    Medication/Dose: semaglutide-weight management (WEGOVY) 0.25 MG/0.5ML pen  Diagnosis and ICD code (if different than what is on RX):    New/renewal/insurance change PA/secondary ins. PA:  Previously Tried and Failed:    Rationale:      Insurance   Primary:   Insurance ID:      Secondary (if applicable):  Insurance ID:      Pharmacy Information (if different than what is on RX)  Name:    Phone:    Fax:    Clinic Information  Preferred routing pool for dept communication:

## 2025-02-17 NOTE — TELEPHONE ENCOUNTER
PRIOR AUTHORIZATION DENIED    Medication: semaglutide-weight management (WEGOVY) 0.25 MG/0.5ML pen    Denial Date: 2/17/2025    Denial Rational:  Per insurance, medication is excluded from patient's benefit plan and will not be covered. Patient is able to use [employer], [employer] or another discount card to help with the cost of the medication. An appeal is NOT available on excluded medications. Please follow up with the patient and close encounter.   Thank You.                     Appeal Information:  N/A

## 2025-02-28 PROBLEM — G47.9 SLEEP DISORDER: Status: ACTIVE | Noted: 2025-02-28

## 2025-03-03 DIAGNOSIS — N76.0 BACTERIAL VAGINOSIS: Primary | ICD-10-CM

## 2025-03-03 DIAGNOSIS — B96.89 BACTERIAL VAGINOSIS: Primary | ICD-10-CM

## 2025-03-03 RX ORDER — METRONIDAZOLE 500 MG/1
500 TABLET ORAL 2 TIMES DAILY
Qty: 14 TABLET | Refills: 0 | Status: SHIPPED | OUTPATIENT
Start: 2025-03-03 | End: 2025-03-10